# Patient Record
Sex: FEMALE | Race: WHITE | NOT HISPANIC OR LATINO | Employment: OTHER | URBAN - METROPOLITAN AREA
[De-identification: names, ages, dates, MRNs, and addresses within clinical notes are randomized per-mention and may not be internally consistent; named-entity substitution may affect disease eponyms.]

---

## 2023-04-12 LAB
ALANINE AMINOTRANSFERASE (SGPT) (U/L) IN SER/PLAS: 14 U/L (ref 7–45)
ALBUMIN (G/DL) IN SER/PLAS: 3.9 G/DL (ref 3.4–5)
ALKALINE PHOSPHATASE (U/L) IN SER/PLAS: 89 U/L (ref 33–136)
ANION GAP IN SER/PLAS: 13 MMOL/L (ref 10–20)
ASPARTATE AMINOTRANSFERASE (SGOT) (U/L) IN SER/PLAS: 22 U/L (ref 9–39)
BASOPHILS (10*3/UL) IN BLOOD BY AUTOMATED COUNT: 0.07 X10E9/L (ref 0–0.1)
BASOPHILS/100 LEUKOCYTES IN BLOOD BY AUTOMATED COUNT: 1.2 % (ref 0–2)
BILIRUBIN TOTAL (MG/DL) IN SER/PLAS: 0.4 MG/DL (ref 0–1.2)
CALCIDIOL (25 OH VITAMIN D3) (NG/ML) IN SER/PLAS: 27 NG/ML
CALCIUM (MG/DL) IN SER/PLAS: 8.8 MG/DL (ref 8.6–10.3)
CARBON DIOXIDE, TOTAL (MMOL/L) IN SER/PLAS: 24 MMOL/L (ref 21–32)
CHLORIDE (MMOL/L) IN SER/PLAS: 104 MMOL/L (ref 98–107)
CREATININE (MG/DL) IN SER/PLAS: 0.89 MG/DL (ref 0.5–1.05)
EOSINOPHILS (10*3/UL) IN BLOOD BY AUTOMATED COUNT: 0.28 X10E9/L (ref 0–0.4)
EOSINOPHILS/100 LEUKOCYTES IN BLOOD BY AUTOMATED COUNT: 4.9 % (ref 0–6)
ERYTHROCYTE DISTRIBUTION WIDTH (RATIO) BY AUTOMATED COUNT: 14.8 % (ref 11.5–14.5)
ERYTHROCYTE MEAN CORPUSCULAR HEMOGLOBIN CONCENTRATION (G/DL) BY AUTOMATED: 33.1 G/DL (ref 32–36)
ERYTHROCYTE MEAN CORPUSCULAR VOLUME (FL) BY AUTOMATED COUNT: 98 FL (ref 80–100)
ERYTHROCYTES (10*6/UL) IN BLOOD BY AUTOMATED COUNT: 4.29 X10E12/L (ref 4–5.2)
GFR FEMALE: 65 ML/MIN/1.73M2
GLUCOSE (MG/DL) IN SER/PLAS: 102 MG/DL (ref 74–99)
HEMATOCRIT (%) IN BLOOD BY AUTOMATED COUNT: 42 % (ref 36–46)
HEMOGLOBIN (G/DL) IN BLOOD: 13.9 G/DL (ref 12–16)
IMMATURE GRANULOCYTES/100 LEUKOCYTES IN BLOOD BY AUTOMATED COUNT: 0.4 % (ref 0–0.9)
LEUKOCYTES (10*3/UL) IN BLOOD BY AUTOMATED COUNT: 5.7 X10E9/L (ref 4.4–11.3)
LYMPHOCYTES (10*3/UL) IN BLOOD BY AUTOMATED COUNT: 1.39 X10E9/L (ref 0.8–3)
LYMPHOCYTES/100 LEUKOCYTES IN BLOOD BY AUTOMATED COUNT: 24.3 % (ref 13–44)
MONOCYTES (10*3/UL) IN BLOOD BY AUTOMATED COUNT: 0.45 X10E9/L (ref 0.05–0.8)
MONOCYTES/100 LEUKOCYTES IN BLOOD BY AUTOMATED COUNT: 7.9 % (ref 2–10)
NEUTROPHILS (10*3/UL) IN BLOOD BY AUTOMATED COUNT: 3.5 X10E9/L (ref 1.6–5.5)
NEUTROPHILS/100 LEUKOCYTES IN BLOOD BY AUTOMATED COUNT: 61.3 % (ref 40–80)
PARATHYRIN INTACT (PG/ML) IN SER/PLAS: 65.7 PG/ML (ref 18.5–88)
PLATELETS (10*3/UL) IN BLOOD AUTOMATED COUNT: 264 X10E9/L (ref 150–450)
POTASSIUM (MMOL/L) IN SER/PLAS: 4.1 MMOL/L (ref 3.5–5.3)
PROTEIN TOTAL: 7.3 G/DL (ref 6.4–8.2)
SEDIMENTATION RATE, ERYTHROCYTE: 47 MM/H (ref 0–30)
SODIUM (MMOL/L) IN SER/PLAS: 137 MMOL/L (ref 136–145)
UREA NITROGEN (MG/DL) IN SER/PLAS: 14 MG/DL (ref 6–23)

## 2023-08-09 LAB
ALANINE AMINOTRANSFERASE (SGPT) (U/L) IN SER/PLAS: 12 U/L (ref 7–45)
ALBUMIN (G/DL) IN SER/PLAS: 3.8 G/DL (ref 3.4–5)
ALKALINE PHOSPHATASE (U/L) IN SER/PLAS: 72 U/L (ref 33–136)
ANION GAP IN SER/PLAS: 12 MMOL/L (ref 10–20)
ASPARTATE AMINOTRANSFERASE (SGOT) (U/L) IN SER/PLAS: 19 U/L (ref 9–39)
BASOPHILS (10*3/UL) IN BLOOD BY AUTOMATED COUNT: 0.07 X10E9/L (ref 0–0.1)
BASOPHILS/100 LEUKOCYTES IN BLOOD BY AUTOMATED COUNT: 1 % (ref 0–2)
BILIRUBIN TOTAL (MG/DL) IN SER/PLAS: 0.4 MG/DL (ref 0–1.2)
C REACTIVE PROTEIN (MG/L) IN SER/PLAS: 2.71 MG/DL
CALCIDIOL (25 OH VITAMIN D3) (NG/ML) IN SER/PLAS: 17 NG/ML
CALCIUM (MG/DL) IN SER/PLAS: 9.3 MG/DL (ref 8.6–10.3)
CARBON DIOXIDE, TOTAL (MMOL/L) IN SER/PLAS: 26 MMOL/L (ref 21–32)
CHLORIDE (MMOL/L) IN SER/PLAS: 103 MMOL/L (ref 98–107)
CREATININE (MG/DL) IN SER/PLAS: 0.86 MG/DL (ref 0.5–1.05)
EOSINOPHILS (10*3/UL) IN BLOOD BY AUTOMATED COUNT: 0.13 X10E9/L (ref 0–0.4)
EOSINOPHILS/100 LEUKOCYTES IN BLOOD BY AUTOMATED COUNT: 1.9 % (ref 0–6)
ERYTHROCYTE DISTRIBUTION WIDTH (RATIO) BY AUTOMATED COUNT: 13.7 % (ref 11.5–14.5)
ERYTHROCYTE MEAN CORPUSCULAR HEMOGLOBIN CONCENTRATION (G/DL) BY AUTOMATED: 33.4 G/DL (ref 32–36)
ERYTHROCYTE MEAN CORPUSCULAR VOLUME (FL) BY AUTOMATED COUNT: 99 FL (ref 80–100)
ERYTHROCYTES (10*6/UL) IN BLOOD BY AUTOMATED COUNT: 4.31 X10E12/L (ref 4–5.2)
GFR FEMALE: 67 ML/MIN/1.73M2
GLUCOSE (MG/DL) IN SER/PLAS: 94 MG/DL (ref 74–99)
HEMATOCRIT (%) IN BLOOD BY AUTOMATED COUNT: 42.5 % (ref 36–46)
HEMOGLOBIN (G/DL) IN BLOOD: 14.2 G/DL (ref 12–16)
IMMATURE GRANULOCYTES/100 LEUKOCYTES IN BLOOD BY AUTOMATED COUNT: 0.7 % (ref 0–0.9)
LEUKOCYTES (10*3/UL) IN BLOOD BY AUTOMATED COUNT: 6.9 X10E9/L (ref 4.4–11.3)
LYMPHOCYTES (10*3/UL) IN BLOOD BY AUTOMATED COUNT: 1.13 X10E9/L (ref 0.8–3)
LYMPHOCYTES/100 LEUKOCYTES IN BLOOD BY AUTOMATED COUNT: 16.3 % (ref 13–44)
MONOCYTES (10*3/UL) IN BLOOD BY AUTOMATED COUNT: 0.79 X10E9/L (ref 0.05–0.8)
MONOCYTES/100 LEUKOCYTES IN BLOOD BY AUTOMATED COUNT: 11.4 % (ref 2–10)
NEUTROPHILS (10*3/UL) IN BLOOD BY AUTOMATED COUNT: 4.76 X10E9/L (ref 1.6–5.5)
NEUTROPHILS/100 LEUKOCYTES IN BLOOD BY AUTOMATED COUNT: 68.7 % (ref 40–80)
PLATELETS (10*3/UL) IN BLOOD AUTOMATED COUNT: 276 X10E9/L (ref 150–450)
POTASSIUM (MMOL/L) IN SER/PLAS: 4.1 MMOL/L (ref 3.5–5.3)
PROTEIN TOTAL: 7.6 G/DL (ref 6.4–8.2)
SEDIMENTATION RATE, ERYTHROCYTE: 63 MM/H (ref 0–30)
SODIUM (MMOL/L) IN SER/PLAS: 137 MMOL/L (ref 136–145)
UREA NITROGEN (MG/DL) IN SER/PLAS: 15 MG/DL (ref 6–23)

## 2023-10-11 ENCOUNTER — TELEPHONE (OUTPATIENT)
Dept: PRIMARY CARE | Facility: CLINIC | Age: 83
End: 2023-10-11
Payer: MEDICARE

## 2023-10-11 NOTE — TELEPHONE ENCOUNTER
Chief Complaint:    Symptoms: cough, coughing phlegm-yellow in color, just don't feel good     Duration: 2 weeks     Treatments:    Patient Requesting: Recommendation     Did the patient have their Covid Vaccine? She isn't sure     Pharmacy: [facility]

## 2023-10-16 DIAGNOSIS — Z23 FLU VACCINE NEED: ICD-10-CM

## 2023-10-16 PROBLEM — M15.9 GENERALIZED OA: Status: ACTIVE | Noted: 2023-10-16

## 2023-10-16 PROBLEM — M19.011 OSTEOARTHRITIS OF RIGHT SHOULDER: Status: ACTIVE | Noted: 2023-10-16

## 2023-10-16 PROBLEM — M06.4 POLYARTHROPATHY, INFLAMMATORY (MULTI): Status: ACTIVE | Noted: 2023-10-16

## 2023-10-16 PROBLEM — B02.30 HERPES ZOSTER OPHTHALMICUS OF RIGHT EYE: Status: ACTIVE | Noted: 2023-10-16

## 2023-10-16 PROBLEM — M35.3 PMR (POLYMYALGIA RHEUMATICA) (MULTI): Status: ACTIVE | Noted: 2023-10-16

## 2023-10-16 PROBLEM — M35.02: Status: ACTIVE | Noted: 2023-10-16

## 2023-10-16 PROBLEM — M81.0 OSTEOPOROSIS, SENILE: Status: ACTIVE | Noted: 2023-10-16

## 2023-10-16 PROBLEM — M47.817 LUMBAR AND SACRAL SPONDYLARTHRITIS: Status: ACTIVE | Noted: 2023-10-16

## 2023-10-16 PROBLEM — B37.2 CANDIDIASIS OF SKIN: Status: ACTIVE | Noted: 2023-10-16

## 2023-10-16 PROBLEM — J84.178: Status: ACTIVE | Noted: 2023-10-16

## 2023-10-16 PROBLEM — J18.9 PNEUMONIA: Status: ACTIVE | Noted: 2023-10-16

## 2023-10-16 PROBLEM — K21.9 GASTROESOPHAGEAL REFLUX DISEASE WITHOUT ESOPHAGITIS: Status: ACTIVE | Noted: 2023-10-16

## 2023-10-16 PROBLEM — M54.16 RADICULOPATHY, LUMBAR REGION: Status: ACTIVE | Noted: 2023-10-16

## 2023-10-16 PROBLEM — J30.2 PERENNIAL ALLERGIC RHINITIS WITH SEASONAL VARIATION: Status: ACTIVE | Noted: 2023-10-16

## 2023-10-16 PROBLEM — M19.012 OSTEOARTHRITIS OF LEFT SHOULDER: Status: ACTIVE | Noted: 2023-10-16

## 2023-10-16 PROBLEM — J30.89 PERENNIAL ALLERGIC RHINITIS WITH SEASONAL VARIATION: Status: ACTIVE | Noted: 2023-10-16

## 2023-10-16 PROBLEM — G93.32 CHRONIC FATIGUE SYNDROME WITH FIBROMYALGIA: Status: ACTIVE | Noted: 2023-10-16

## 2023-10-16 PROBLEM — M06.9 RHEUMATOID ARTHRITIS (MULTI): Status: ACTIVE | Noted: 2023-10-16

## 2023-10-16 PROBLEM — M79.7 FIBROMYALGIA: Status: ACTIVE | Noted: 2023-10-16

## 2023-10-16 PROBLEM — N39.41 SENSORY URGE INCONTINENCE: Status: ACTIVE | Noted: 2023-10-16

## 2023-10-16 PROBLEM — R35.0 FREQUENT URINATION: Status: ACTIVE | Noted: 2023-10-16

## 2023-10-16 PROBLEM — M79.7 CHRONIC FATIGUE SYNDROME WITH FIBROMYALGIA: Status: ACTIVE | Noted: 2023-10-16

## 2023-10-16 PROBLEM — E55.9 VITAMIN D DEFICIENCY: Status: ACTIVE | Noted: 2023-10-16

## 2023-10-16 RX ORDER — ACETAMINOPHEN 500 MG
1000 TABLET ORAL
COMMUNITY

## 2023-10-16 RX ORDER — METHOTREXATE 2.5 MG/1
12.5 TABLET ORAL WEEKLY
COMMUNITY
Start: 2021-11-17 | End: 2023-11-07

## 2023-10-16 RX ORDER — ACYCLOVIR 800 MG/1
800 TABLET ORAL
COMMUNITY
Start: 2023-04-28

## 2023-10-16 RX ORDER — GANCICLOVIR 1.5 MG/G
1 GEL OPHTHALMIC
COMMUNITY
Start: 2023-06-27

## 2023-10-16 RX ORDER — ALENDRONATE SODIUM 70 MG/1
70 TABLET ORAL
COMMUNITY
Start: 2022-05-05

## 2023-10-16 RX ORDER — FOLIC ACID 1 MG/1
2 TABLET ORAL
COMMUNITY
Start: 2023-08-07 | End: 2024-02-03

## 2023-10-17 ENCOUNTER — OFFICE VISIT (OUTPATIENT)
Dept: PRIMARY CARE | Facility: CLINIC | Age: 83
End: 2023-10-17
Payer: MEDICARE

## 2023-10-17 VITALS
HEIGHT: 64 IN | WEIGHT: 169 LBS | BODY MASS INDEX: 28.85 KG/M2 | DIASTOLIC BLOOD PRESSURE: 60 MMHG | HEART RATE: 60 BPM | SYSTOLIC BLOOD PRESSURE: 112 MMHG

## 2023-10-17 DIAGNOSIS — G93.32 CHRONIC FATIGUE SYNDROME WITH FIBROMYALGIA: Primary | ICD-10-CM

## 2023-10-17 DIAGNOSIS — E55.9 VITAMIN D DEFICIENCY: ICD-10-CM

## 2023-10-17 DIAGNOSIS — M79.7 CHRONIC FATIGUE SYNDROME WITH FIBROMYALGIA: Primary | ICD-10-CM

## 2023-10-17 DIAGNOSIS — M35.02: ICD-10-CM

## 2023-10-17 DIAGNOSIS — M05.741 RHEUMATOID ARTHRITIS INVOLVING BOTH HANDS WITH POSITIVE RHEUMATOID FACTOR (MULTI): ICD-10-CM

## 2023-10-17 DIAGNOSIS — Z23 FLU VACCINE NEED: ICD-10-CM

## 2023-10-17 DIAGNOSIS — N39.41 SENSORY URGE INCONTINENCE: ICD-10-CM

## 2023-10-17 DIAGNOSIS — M05.742 RHEUMATOID ARTHRITIS INVOLVING BOTH HANDS WITH POSITIVE RHEUMATOID FACTOR (MULTI): ICD-10-CM

## 2023-10-17 DIAGNOSIS — M06.4 POLYARTHROPATHY, INFLAMMATORY (MULTI): ICD-10-CM

## 2023-10-17 DIAGNOSIS — M35.3 PMR (POLYMYALGIA RHEUMATICA) (MULTI): ICD-10-CM

## 2023-10-17 DIAGNOSIS — B02.30 HERPES ZOSTER OPHTHALMICUS OF RIGHT EYE: ICD-10-CM

## 2023-10-17 PROBLEM — R35.0 FREQUENT URINATION: Status: RESOLVED | Noted: 2023-10-16 | Resolved: 2023-10-17

## 2023-10-17 PROBLEM — J18.9 PNEUMONIA: Status: RESOLVED | Noted: 2023-10-16 | Resolved: 2023-10-17

## 2023-10-17 PROCEDURE — 1159F MED LIST DOCD IN RCRD: CPT | Performed by: INTERNAL MEDICINE

## 2023-10-17 PROCEDURE — 99214 OFFICE O/P EST MOD 30 MIN: CPT | Performed by: INTERNAL MEDICINE

## 2023-10-17 PROCEDURE — 90662 IIV NO PRSV INCREASED AG IM: CPT | Performed by: INTERNAL MEDICINE

## 2023-10-17 PROCEDURE — 1160F RVW MEDS BY RX/DR IN RCRD: CPT | Performed by: INTERNAL MEDICINE

## 2023-10-17 PROCEDURE — G0008 ADMIN INFLUENZA VIRUS VAC: HCPCS | Performed by: INTERNAL MEDICINE

## 2023-10-17 PROCEDURE — 1036F TOBACCO NON-USER: CPT | Performed by: INTERNAL MEDICINE

## 2023-10-17 RX ORDER — CHOLECALCIFEROL (VITAMIN D3) 50 MCG
50 TABLET ORAL DAILY
Qty: 90 TABLET | Refills: 3 | Status: SHIPPED | OUTPATIENT
Start: 2023-10-17 | End: 2023-10-18 | Stop reason: SDUPTHER

## 2023-10-17 ASSESSMENT — ANXIETY QUESTIONNAIRES
5. BEING SO RESTLESS THAT IT IS HARD TO SIT STILL: NOT AT ALL
2. NOT BEING ABLE TO STOP OR CONTROL WORRYING: NOT AT ALL
7. FEELING AFRAID AS IF SOMETHING AWFUL MIGHT HAPPEN: NOT AT ALL
6. BECOMING EASILY ANNOYED OR IRRITABLE: NOT AT ALL
4. TROUBLE RELAXING: NOT AT ALL
1. FEELING NERVOUS, ANXIOUS, OR ON EDGE: NOT AT ALL
3. WORRYING TOO MUCH ABOUT DIFFERENT THINGS: NOT AT ALL
IF YOU CHECKED OFF ANY PROBLEMS ON THIS QUESTIONNAIRE, HOW DIFFICULT HAVE THESE PROBLEMS MADE IT FOR YOU TO DO YOUR WORK, TAKE CARE OF THINGS AT HOME, OR GET ALONG WITH OTHER PEOPLE: NOT DIFFICULT AT ALL
GAD7 TOTAL SCORE: 0

## 2023-10-17 ASSESSMENT — ENCOUNTER SYMPTOMS
OCCASIONAL FEELINGS OF UNSTEADINESS: 0
DEPRESSION: 0
COUGH: 1
SHORTNESS OF BREATH: 1
FATIGUE: 1
LOSS OF SENSATION IN FEET: 0

## 2023-10-17 ASSESSMENT — COLUMBIA-SUICIDE SEVERITY RATING SCALE - C-SSRS
2. HAVE YOU ACTUALLY HAD ANY THOUGHTS OF KILLING YOURSELF?: NO
1. IN THE PAST MONTH, HAVE YOU WISHED YOU WERE DEAD OR WISHED YOU COULD GO TO SLEEP AND NOT WAKE UP?: NO
6. HAVE YOU EVER DONE ANYTHING, STARTED TO DO ANYTHING, OR PREPARED TO DO ANYTHING TO END YOUR LIFE?: NO

## 2023-10-17 ASSESSMENT — PATIENT HEALTH QUESTIONNAIRE - PHQ9
1. LITTLE INTEREST OR PLEASURE IN DOING THINGS: NOT AT ALL
2. FEELING DOWN, DEPRESSED OR HOPELESS: NOT AT ALL
SUM OF ALL RESPONSES TO PHQ9 QUESTIONS 1 AND 2: 0

## 2023-10-17 NOTE — ASSESSMENT & PLAN NOTE
Previous work-up in the past includes prolapse consider referral back to urology for candidate for anticholinergic medications secondary to side effects

## 2023-10-17 NOTE — ASSESSMENT & PLAN NOTE
Stable at this time chronic fibrotic changes no evidence of superimposed bronchiectasis at this time

## 2023-10-17 NOTE — ASSESSMENT & PLAN NOTE
Vitamin D levels low in the setting of treatment for osteoporosis and use of steroids for PMR rheumatoid arthritis and Sjogren's start 2000 units vitamin D and reevaluate

## 2023-10-17 NOTE — PROGRESS NOTES
"Subjective   Reason for Visit: Roxi Barrios is an 83 y.o. female here for a fu visit.     Past Medical, Surgical, and Family History reviewed and updated in chart.    Reviewed all medications by prescribing practitioner or clinical pharmacist (such as prescriptions, OTCs, herbal therapies and supplements) and documented in the medical record.    Patient with history of rheumatoid arthritis of hands also Sjogren's syndrome with postinflammatory pulmonary fibrosis history of chronic fatigue syndrome fibromyalgia polymyalgia rheumatica and pseudogout manage mostly through rheumatologist concomitant severe osteoarthritis of shoulder joints with mild cough improving at this time recently placed on steroid taper for pseudogout and arthritis reviewed labs from rheumatology stable and improved inflammatory levels at this time history of bronchiectasis with chronic fibrotic changes sounds on lungs without severe exacerbation at this time        Patient Care Team:  Raji Freedman DO as PCP - General     Review of Systems   Constitutional:  Positive for fatigue.   Respiratory:  Positive for cough and shortness of breath.        Objective   Vitals:  /60 (BP Location: Left arm, Patient Position: Sitting)   Pulse 60   Ht 1.626 m (5' 4\")   Wt 76.7 kg (169 lb)   BMI 29.01 kg/m²       Physical Exam  Vitals and nursing note reviewed.   Constitutional:       General: She is not in acute distress.     Appearance: Normal appearance. She is well-developed. She is not toxic-appearing.   HENT:      Head: Normocephalic and atraumatic.      Right Ear: Tympanic membrane and external ear normal.      Left Ear: Tympanic membrane and external ear normal.      Nose: Nose normal.      Mouth/Throat:      Mouth: Mucous membranes are moist.      Pharynx: Oropharynx is clear. No oropharyngeal exudate or posterior oropharyngeal erythema.      Tonsils: No tonsillar exudate. 2+ on the right. 2+ on the left.   Eyes:      Extraocular " Movements: Extraocular movements intact.      Conjunctiva/sclera: Conjunctivae normal.   Cardiovascular:      Rate and Rhythm: Normal rate and regular rhythm.      Pulses: Normal pulses.      Heart sounds: Normal heart sounds. No murmur heard.  Pulmonary:      Effort: Pulmonary effort is normal.      Breath sounds: Rhonchi present.   Abdominal:      General: Abdomen is flat. Bowel sounds are normal.      Palpations: Abdomen is soft.   Musculoskeletal:      Cervical back: Neck supple.   Lymphadenopathy:      Cervical: No cervical adenopathy.   Skin:     General: Skin is warm and dry.      Findings: No rash.   Neurological:      Mental Status: She is alert. Mental status is at baseline.   Psychiatric:         Mood and Affect: Mood normal.         Behavior: Behavior normal.         Thought Content: Thought content normal.         Judgment: Judgment normal.         Assessment/Plan   Problem List Items Addressed This Visit       Chronic fatigue syndrome with fibromyalgia - Primary    Current Assessment & Plan     Continue to encourage adequate sleep regular activity and exercise as tolerated         Relevant Medications    cholecalciferol (Vitamin D3) 50 MCG (2000 UT) tablet    Other Relevant Orders    Follow Up In [facility] - PCP - Established    Disorder of lung with Sjogren's syndrome (CMS/[facility])    Current Assessment & Plan     Stable at this time chronic fibrotic changes no evidence of superimposed bronchiectasis at this time         Relevant Medications    cholecalciferol (Vitamin D3) 50 MCG (2000 UT) tablet    Other Relevant Orders    Follow Up In [facility] - PCP - Established    Herpes zoster ophthalmicus of right eye    Current Assessment & Plan     Continue with artificial tear formulation given by ophthalmologist         Polyarthropathy, 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)    Current Assessment & Plan     Stabilizing on current prednisone taper         Sensory urge incontinence    Current Assessment & Plan     Previous work-up in the past includes prolapse consider referral back to urology for candidate for anticholinergic medications secondary to side effects         Vitamin D deficiency    Current Assessment & Plan     Vitamin D levels low in the setting of treatment for osteoporosis and use of steroids for PMR rheumatoid arthritis and Sjogren's start 2000 units vitamin D and reevaluate         Relevant Medications    cholecalciferol (Vitamin D3) 50 MCG (2000 UT) tablet    Other Relevant Orders    Follow Up In [facility] - PCP - Established    Adult body mass index 29.0-29.9    Current Assessment & Plan     Improvement in overall weight continue          Other Visit Diagnoses       Flu vaccine need

## 2023-10-17 NOTE — PROGRESS NOTES
"Subjective   Patient ID: Roxi Barrios is a 83 y.o. female who presents for Generalized Body Aches.    HPI     Review of Systems    Objective   /60 (BP Location: Left arm, Patient Position: Sitting)   Pulse 60   Ht 1.626 m (5' 4\")   Wt 76.7 kg (169 lb)   BMI 29.01 kg/m²     Physical Exam    Assessment/Plan          "

## 2023-10-17 NOTE — ASSESSMENT & PLAN NOTE
>>ASSESSMENT AND PLAN FOR RHEUMATOID ARTHRITIS (CMS/[facility]) WRITTEN ON 10/17/2023  7:02 PM BY KRIS RAMIREZ, DO    Mostly affecting hands no evidence of acute synovitis on exam today continues on methotrexate 2.5 mg 5 pills weekly on Sunday has follow-up with rheumatologist end of this month    >>ASSESSMENT AND PLAN FOR POLYARTHROPATHY, INFLAMMATORY (CMS/[facility]) WRITTEN ON 10/17/2023  7:01 PM BY KRIS RAMIREZ, DO    Continue with prednisone taper

## 2023-10-18 ENCOUNTER — TELEPHONE (OUTPATIENT)
Dept: PRIMARY CARE | Facility: CLINIC | Age: 83
End: 2023-10-18
Payer: MEDICARE

## 2023-10-18 DIAGNOSIS — G93.32 CHRONIC FATIGUE SYNDROME WITH FIBROMYALGIA: ICD-10-CM

## 2023-10-18 DIAGNOSIS — M05.741 RHEUMATOID ARTHRITIS INVOLVING BOTH HANDS WITH POSITIVE RHEUMATOID FACTOR (MULTI): ICD-10-CM

## 2023-10-18 DIAGNOSIS — M35.02: ICD-10-CM

## 2023-10-18 DIAGNOSIS — M79.7 CHRONIC FATIGUE SYNDROME WITH FIBROMYALGIA: ICD-10-CM

## 2023-10-18 DIAGNOSIS — M05.742 RHEUMATOID ARTHRITIS INVOLVING BOTH HANDS WITH POSITIVE RHEUMATOID FACTOR (MULTI): ICD-10-CM

## 2023-10-18 DIAGNOSIS — E55.9 VITAMIN D DEFICIENCY: ICD-10-CM

## 2023-10-18 RX ORDER — CHOLECALCIFEROL (VITAMIN D3) 50 MCG
50 TABLET ORAL DAILY
Qty: 90 TABLET | Refills: 3 | Status: SHIPPED | OUTPATIENT
Start: 2023-10-18 | End: 2024-10-17

## 2023-10-18 NOTE — TELEPHONE ENCOUNTER
Vitamin D was suppose to be sent to local pharmacy, can you resent it please       [place]:Moises

## 2023-10-30 ENCOUNTER — TELEPHONE (OUTPATIENT)
Dept: PRIMARY CARE | Facility: CLINIC | Age: 83
End: 2023-10-30
Payer: MEDICARE

## 2023-10-30 VITALS
RESPIRATION RATE: 18 BRPM | TEMPERATURE: 98.1 F | HEIGHT: 63 IN | OXYGEN SATURATION: 94 % | SYSTOLIC BLOOD PRESSURE: 142 MMHG | WEIGHT: 158 LBS | DIASTOLIC BLOOD PRESSURE: 86 MMHG | BODY MASS INDEX: 28 KG/M2

## 2023-10-30 PROCEDURE — 99283 EMERGENCY DEPT VISIT LOW MDM: CPT | Mod: 25

## 2023-10-30 PROCEDURE — 99284 EMERGENCY DEPT VISIT MOD MDM: CPT | Performed by: EMERGENCY MEDICINE

## 2023-10-30 PROCEDURE — 96372 THER/PROPH/DIAG INJ SC/IM: CPT

## 2023-10-30 ASSESSMENT — COLUMBIA-SUICIDE SEVERITY RATING SCALE - C-SSRS
6. HAVE YOU EVER DONE ANYTHING, STARTED TO DO ANYTHING, OR PREPARED TO DO ANYTHING TO END YOUR LIFE?: NO
2. HAVE YOU ACTUALLY HAD ANY THOUGHTS OF KILLING YOURSELF?: NO
1. IN THE PAST MONTH, HAVE YOU WISHED YOU WERE DEAD OR WISHED YOU COULD GO TO SLEEP AND NOT WAKE UP?: NO

## 2023-10-30 ASSESSMENT — PAIN SCALES - GENERAL: PAINLEVEL_OUTOF10: 9

## 2023-10-30 ASSESSMENT — PAIN - FUNCTIONAL ASSESSMENT: PAIN_FUNCTIONAL_ASSESSMENT: 0-10

## 2023-10-30 NOTE — TELEPHONE ENCOUNTER
She called cause she would like some input about her neck, back, hip & knees they are getting so sore and she just don't know what to do please call her at [phone] please advise

## 2023-10-31 ENCOUNTER — HOSPITAL ENCOUNTER (EMERGENCY)
Facility: HOSPITAL | Age: 83
Discharge: HOME | End: 2023-10-31
Attending: EMERGENCY MEDICINE
Payer: MEDICARE

## 2023-10-31 DIAGNOSIS — M54.2 NECK PAIN: Primary | ICD-10-CM

## 2023-10-31 PROCEDURE — 2500000005 HC RX 250 GENERAL PHARMACY W/O HCPCS: Performed by: EMERGENCY MEDICINE

## 2023-10-31 PROCEDURE — 2500000001 HC RX 250 WO HCPCS SELF ADMINISTERED DRUGS (ALT 637 FOR MEDICARE OP): Performed by: EMERGENCY MEDICINE

## 2023-10-31 PROCEDURE — 2500000004 HC RX 250 GENERAL PHARMACY W/ HCPCS (ALT 636 FOR OP/ED): Performed by: EMERGENCY MEDICINE

## 2023-10-31 RX ORDER — LIDOCAINE 560 MG/1
1 PATCH PERCUTANEOUS; TOPICAL; TRANSDERMAL ONCE
Status: DISCONTINUED | OUTPATIENT
Start: 2023-10-31 | End: 2023-10-31 | Stop reason: HOSPADM

## 2023-10-31 RX ORDER — KETOROLAC TROMETHAMINE 30 MG/ML
15 INJECTION, SOLUTION INTRAMUSCULAR; INTRAVENOUS ONCE
Status: COMPLETED | OUTPATIENT
Start: 2023-10-31 | End: 2023-10-31

## 2023-10-31 RX ORDER — LIDOCAINE 560 MG/1
1 PATCH PERCUTANEOUS; TOPICAL; TRANSDERMAL DAILY
Status: DISCONTINUED | OUTPATIENT
Start: 2023-10-31 | End: 2023-10-31

## 2023-10-31 RX ORDER — CYCLOBENZAPRINE HCL 10 MG
5 TABLET ORAL ONCE
Status: COMPLETED | OUTPATIENT
Start: 2023-10-31 | End: 2023-10-31

## 2023-10-31 RX ORDER — LIDOCAINE 560 MG/1
1 PATCH PERCUTANEOUS; TOPICAL; TRANSDERMAL DAILY
Status: DISCONTINUED | OUTPATIENT
Start: 2023-10-31 | End: 2023-10-31 | Stop reason: HOSPADM

## 2023-10-31 RX ADMIN — KETOROLAC TROMETHAMINE 15 MG: 30 INJECTION, SOLUTION INTRAMUSCULAR at 02:15

## 2023-10-31 RX ADMIN — LIDOCAINE 1 PATCH: 4 PATCH TOPICAL at 02:15

## 2023-10-31 RX ADMIN — CYCLOBENZAPRINE 5 MG: 10 TABLET, FILM COATED ORAL at 05:01

## 2023-11-08 ENCOUNTER — LAB (OUTPATIENT)
Dept: LAB | Facility: LAB | Age: 83
End: 2023-11-08
Payer: MEDICARE

## 2023-11-08 ENCOUNTER — OFFICE VISIT (OUTPATIENT)
Dept: PRIMARY CARE | Facility: CLINIC | Age: 83
End: 2023-11-08
Payer: MEDICARE

## 2023-11-08 VITALS
BODY MASS INDEX: 30 KG/M2 | HEART RATE: 60 BPM | RESPIRATION RATE: 16 BRPM | HEIGHT: 63 IN | DIASTOLIC BLOOD PRESSURE: 74 MMHG | OXYGEN SATURATION: 95 % | WEIGHT: 169.3 LBS | SYSTOLIC BLOOD PRESSURE: 123 MMHG

## 2023-11-08 DIAGNOSIS — R35.0 URINARY FREQUENCY: ICD-10-CM

## 2023-11-08 DIAGNOSIS — M06.4 POLYARTHROPATHY, INFLAMMATORY (MULTI): ICD-10-CM

## 2023-11-08 DIAGNOSIS — M79.10 MYALGIA: ICD-10-CM

## 2023-11-08 DIAGNOSIS — R10.9 ACUTE LEFT FLANK PAIN: Primary | ICD-10-CM

## 2023-11-08 LAB
BASOPHILS # BLD AUTO: 0.07 X10*3/UL (ref 0–0.1)
BASOPHILS NFR BLD AUTO: 1 %
CRP SERPL-MCNC: 2.11 MG/DL
EOSINOPHIL # BLD AUTO: 0.11 X10*3/UL (ref 0–0.4)
EOSINOPHIL NFR BLD AUTO: 1.5 %
ERYTHROCYTE [DISTWIDTH] IN BLOOD BY AUTOMATED COUNT: 13.2 % (ref 11.5–14.5)
ERYTHROCYTE [SEDIMENTATION RATE] IN BLOOD BY WESTERGREN METHOD: 64 MM/H (ref 0–30)
HCT VFR BLD AUTO: 43.1 % (ref 36–46)
HGB BLD-MCNC: 14.4 G/DL (ref 12–16)
IMM GRANULOCYTES # BLD AUTO: 0.04 X10*3/UL (ref 0–0.5)
IMM GRANULOCYTES NFR BLD AUTO: 0.6 % (ref 0–0.9)
LYMPHOCYTES # BLD AUTO: 1.63 X10*3/UL (ref 0.8–3)
LYMPHOCYTES NFR BLD AUTO: 22.8 %
MCH RBC QN AUTO: 32.5 PG (ref 26–34)
MCHC RBC AUTO-ENTMCNC: 33.4 G/DL (ref 32–36)
MCV RBC AUTO: 97 FL (ref 80–100)
MONOCYTES # BLD AUTO: 0.66 X10*3/UL (ref 0.05–0.8)
MONOCYTES NFR BLD AUTO: 9.2 %
NEUTROPHILS # BLD AUTO: 4.64 X10*3/UL (ref 1.6–5.5)
NEUTROPHILS NFR BLD AUTO: 64.9 %
NRBC BLD-RTO: 0 /100 WBCS (ref 0–0)
PLATELET # BLD AUTO: 289 X10*3/UL (ref 150–450)
POC APPEARANCE, URINE: ABNORMAL
POC BILIRUBIN, URINE: NEGATIVE
POC BLOOD, URINE: ABNORMAL
POC COLOR, URINE: YELLOW
POC GLUCOSE, URINE: NEGATIVE MG/DL
POC KETONES, URINE: NEGATIVE MG/DL
POC LEUKOCYTES, URINE: ABNORMAL
POC NITRITE,URINE: NEGATIVE
POC PH, URINE: 5.5 PH
POC PROTEIN, URINE: NEGATIVE MG/DL
POC SPECIFIC GRAVITY, URINE: 1.02
POC UROBILINOGEN, URINE: 0.2 EU/DL
RBC # BLD AUTO: 4.43 X10*6/UL (ref 4–5.2)
WBC # BLD AUTO: 7.2 X10*3/UL (ref 4.4–11.3)

## 2023-11-08 PROCEDURE — 86140 C-REACTIVE PROTEIN: CPT

## 2023-11-08 PROCEDURE — 1159F MED LIST DOCD IN RCRD: CPT | Performed by: FAMILY MEDICINE

## 2023-11-08 PROCEDURE — 81003 URINALYSIS AUTO W/O SCOPE: CPT | Performed by: FAMILY MEDICINE

## 2023-11-08 PROCEDURE — 1036F TOBACCO NON-USER: CPT | Performed by: FAMILY MEDICINE

## 2023-11-08 PROCEDURE — 85025 COMPLETE CBC W/AUTO DIFF WBC: CPT

## 2023-11-08 PROCEDURE — 87086 URINE CULTURE/COLONY COUNT: CPT

## 2023-11-08 PROCEDURE — 1125F AMNT PAIN NOTED PAIN PRSNT: CPT | Performed by: FAMILY MEDICINE

## 2023-11-08 PROCEDURE — 85652 RBC SED RATE AUTOMATED: CPT

## 2023-11-08 PROCEDURE — 1160F RVW MEDS BY RX/DR IN RCRD: CPT | Performed by: FAMILY MEDICINE

## 2023-11-08 PROCEDURE — 36415 COLL VENOUS BLD VENIPUNCTURE: CPT

## 2023-11-08 PROCEDURE — 99213 OFFICE O/P EST LOW 20 MIN: CPT | Performed by: FAMILY MEDICINE

## 2023-11-08 RX ORDER — PREDNISONE 20 MG/1
TABLET ORAL
Qty: 24 TABLET | Refills: 0 | Status: SHIPPED | OUTPATIENT
Start: 2023-11-08 | End: 2023-11-20

## 2023-11-08 ASSESSMENT — ENCOUNTER SYMPTOMS
BACK PAIN: 1
MYALGIAS: 1
LOSS OF SENSATION IN FEET: 0
DEPRESSION: 0
OCCASIONAL FEELINGS OF UNSTEADINESS: 0
FREQUENCY: 1
ARTHRALGIAS: 1

## 2023-11-08 NOTE — PATIENT INSTRUCTIONS
Diagnoses and all orders for this visit:  Acute left flank pain  -     POCT UA Automated manually resulted  Myalgia  -     XR chest 2 views; Future  -     CBC and Auto Differential; Future  -     Sedimentation Rate; Future  -     C-reactive protein; Future  -     predniSONE (Deltasone) 20 mg tablet; Take 3 tablets (60 mg) by mouth once daily for 4 days, THEN 2 tablets (40 mg) once daily for 4 days, THEN 1 tablet (20 mg) once daily for 4 days. T.  Polyarthropathy, inflammatory (CMS/HCC)  -     XR chest 2 views; Future  -     CBC and Auto Differential; Future  -     Sedimentation Rate; Future  -     C-reactive protein; Future  -     predniSONE (Deltasone) 20 mg tablet; Take 3 tablets (60 mg) by mouth once daily for 4 days, THEN 2 tablets (40 mg) once daily for 4 days, THEN 1 tablet (20 mg) once daily for 4 days.

## 2023-11-08 NOTE — PROGRESS NOTES
Subjective   Patient ID: Roxi Barrios is a 83 y.o. female.    HPI  83 year old female with onsetof left sided flank pain m and then shoulders and knees are so sore that she can barely move, was also bad 1/31/23 and prompted her to go to ER.Shingles in right eye, regarding left sided pain urinates all the time without change in odor or color, legs have stayed cold. No fever, cough congestion, bowels moving well. No chills.   Review of Systems   Genitourinary:  Positive for frequency.   Musculoskeletal:  Positive for arthralgias, back pain and myalgias.        Proximal muscles and left flank.    All other systems reviewed and are negative.  Had remote diagnosis of PMR from Dr. Ramirez. No labwork done since August. No recent ESR. Methotrexate currently;y 5/week. Described pain in left flank region and bilateral symmetrical shoulder weakness and pain.     Objective   Physical Exam  Vitals reviewed.   Constitutional:       Appearance: Normal appearance.   HENT:      Head: Normocephalic and atraumatic.      Right Ear: Tympanic membrane normal.      Left Ear: Tympanic membrane normal.      Nose: Nose normal.      Mouth/Throat:      Mouth: Mucous membranes are moist.      Pharynx: Oropharynx is clear.   Eyes:      Extraocular Movements: Extraocular movements intact.      Conjunctiva/sclera: Conjunctivae normal.      Pupils: Pupils are equal, round, and reactive to light.   Cardiovascular:      Rate and Rhythm: Normal rate and regular rhythm.      Pulses: Normal pulses.      Heart sounds: Normal heart sounds.   Pulmonary:      Effort: Pulmonary effort is normal.      Comments: Decreaased breath sounds left base  Abdominal:      General: Abdomen is flat. Bowel sounds are normal.      Palpations: Abdomen is soft.   Musculoskeletal:         General: Tenderness present. Normal range of motion.      Cervical back: Normal range of motion and neck supple.      Comments: Bilateral shoulders and knees tender, left flank tender to  touch no rashes present.    Skin:     General: Skin is warm and dry.      Capillary Refill: Capillary refill takes less than 2 seconds.   Neurological:      General: No focal deficit present.      Mental Status: She is alert and oriented to person, place, and time.      Motor: Weakness present.      Comments: Bilateral upper extremity   Psychiatric:         Mood and Affect: Mood normal.         Behavior: Behavior normal.       Assessment/Plan   Diagnoses and all orders for this visit:  Acute left flank pain  -     POCT UA Automated manually resulted  Myalgia  -     XR chest 2 views; Future  -     CBC and Auto Differential; Future  -     Sedimentation Rate; Future  -     C-reactive protein; Future  -     predniSONE (Deltasone) 20 mg tablet; Take 3 tablets (60 mg) by mouth once daily for 4 days, THEN 2 tablets (40 mg) once daily for 4 days, THEN 1 tablet (20 mg) once daily for 4 days. T.  Polyarthropathy, inflammatory (CMS/HCC)  -     XR chest 2 views; Future  -     CBC and Auto Differential; Future  -     Sedimentation Rate; Future  -     C-reactive protein; Future  -     predniSONE (Deltasone) 20 mg tablet; Take 3 tablets (60 mg) by mouth once daily for 4 days, THEN 2 tablets (40 mg) once daily for 4 days, THEN 1 tablet (20 mg) once daily for 4 days. T.  Concern for PMR, as is has been in her history and she has autoimmune. Will start on prednisone and get ESR, CBC CRP.   Will also get xray to assure that not referred pain from lung.     u/a today as well due to flank pain and frequency. Small blood, trace leuks  Will notify of results.

## 2023-11-10 ENCOUNTER — HOSPITAL ENCOUNTER (OUTPATIENT)
Dept: RADIOLOGY | Facility: HOSPITAL | Age: 83
Discharge: HOME | End: 2023-11-10
Payer: MEDICARE

## 2023-11-10 DIAGNOSIS — M06.4 POLYARTHROPATHY, INFLAMMATORY (MULTI): ICD-10-CM

## 2023-11-10 DIAGNOSIS — M79.10 MYALGIA: ICD-10-CM

## 2023-11-10 LAB — BACTERIA UR CULT: NORMAL

## 2023-11-10 PROCEDURE — 71046 X-RAY EXAM CHEST 2 VIEWS: CPT | Mod: FY

## 2023-11-10 PROCEDURE — 71046 X-RAY EXAM CHEST 2 VIEWS: CPT | Performed by: RADIOLOGY

## 2023-11-13 ENCOUNTER — TELEPHONE (OUTPATIENT)
Dept: PRIMARY CARE | Facility: CLINIC | Age: 83
End: 2023-11-13
Payer: MEDICARE

## 2023-11-13 NOTE — RESULT ENCOUNTER NOTE
Unchanged chest -ray but showed elevated diaphragm on left which would explain the decreased breath sounds.

## 2023-11-13 NOTE — ED PROVIDER NOTES
Roxi Barrios is a 83 y.o. patient presenting to the ED for neck pain.  The pain has been increasing over the last 3 days.  It began when she woke up after sleeping awkwardly.  She denies any other injury or trigger for her pain.  She has been using acetaminophen without improvement.    Additional History Obtained from: None  Limitations to History: None  ------------------------------------------------------------------------------------------------------------------------------------------  Physical Exam:  Appearance: Alert, cooperative,   Skin: Warm, dry, appropriate color for ethnicity.  Eyes: Cornea clear. No scleral icterus or injection.   ENT: Mucous membranes moist.  Pulmonary: No accessory muscle use or stridor. Clear lung sounds bilaterally without rhonchi or wheezing.   Cardiac: Heart sounds regular without murmur. B/L radial pulses full and symmetric.   Abdomen: Soft, not tender.  No rebound or guarding.   Musculoskeletal: No gross deformities.  No midline spine tenderness, step-offs, deformities.  Neurological: Face symmetrical. Voice clear. Appropriately conversant.  Normal gait.  Psychiatric: Appropriate mood and affect.    Medical Decision Making:  Chronic Medical Conditions Significantly Affecting Care:  has a past medical history of Acute cystitis with hematuria (11/18/2021), Other specified noninflammatory disorders of vagina (07/28/2021), Pain, unspecified (11/17/2021), Personal history of other diseases of the musculoskeletal system and connective tissue, Personal history of other diseases of the musculoskeletal system and connective tissue (06/30/2020), Personal history of other diseases of the musculoskeletal system and connective tissue (06/10/2020), Personal history of other diseases of the respiratory system (09/30/2020), Personal history of other mental and behavioral disorders, Personal history of other specified conditions (09/30/2020), Personal history of other specified conditions  (01/26/2021), and Personal history of other specified conditions (06/13/2016).    Social Determinants of Health Significantly Affecting Care: Not identified.    Differential Diagnosis Considered but not limited to: Suspect musculoskeletal pain.  The patient is neuro vascularly intact.  No apparent trauma.      External Records Reviewed:   I reviewed recent and relevant outside records including: None    Diagnoses as of 11/13/23 1831   Neck pain         Escalation of Care:  Patient was treated symptomatically with Flexeril and Toradol.  She reports significant improvement in pain with this.  She was provided prescription for lidocaine patches to use as needed at home.  She is encouraged to follow-up with her doctor.  Additionally she is instructed to return to the emergency department for any new or worsening symptoms.       Kristine Rowley,   11/13/23 [id number]

## 2023-11-13 NOTE — TELEPHONE ENCOUNTER
----- Message from Marva Stokes MD sent at 11/13/2023  2:15 PM EST -----  Urine culture negative. (1 of 3 results)

## 2023-11-13 NOTE — TELEPHONE ENCOUNTER
----- Message from Marva Stokes MD sent at 11/13/2023  2:14 PM EST -----  Jamar Diane- review of blood work shows markers continue to be high for inflammation, meaning the prednisone should help and would follow up with your rheumatologist. Hope you are feeling better.

## 2023-11-13 NOTE — RESULT ENCOUNTER NOTE
Jamar Diane- review of blood work shows markers continue to be high for inflammation, meaning the prednisone should help and would follow up with your rheumatologist. Hope you are feeling better.

## 2023-11-13 NOTE — TELEPHONE ENCOUNTER
----- Message from Marva Stokes MD sent at 11/13/2023  2:16 PM EST -----  Unchanged chest -ray but showed elevated diaphragm on left which would explain the decreased breath sounds.

## 2023-12-14 ENCOUNTER — OFFICE VISIT (OUTPATIENT)
Dept: PRIMARY CARE | Facility: CLINIC | Age: 83
End: 2023-12-14
Payer: MEDICARE

## 2023-12-14 VITALS
BODY MASS INDEX: 29.23 KG/M2 | WEIGHT: 165 LBS | HEIGHT: 63 IN | HEART RATE: 64 BPM | SYSTOLIC BLOOD PRESSURE: 110 MMHG | DIASTOLIC BLOOD PRESSURE: 64 MMHG

## 2023-12-14 DIAGNOSIS — M35.3 PMR (POLYMYALGIA RHEUMATICA) (MULTI): ICD-10-CM

## 2023-12-14 DIAGNOSIS — E55.9 VITAMIN D DEFICIENCY: ICD-10-CM

## 2023-12-14 DIAGNOSIS — M05.741 RHEUMATOID ARTHRITIS INVOLVING BOTH HANDS WITH POSITIVE RHEUMATOID FACTOR (MULTI): ICD-10-CM

## 2023-12-14 DIAGNOSIS — M79.7 CHRONIC FATIGUE SYNDROME WITH FIBROMYALGIA: ICD-10-CM

## 2023-12-14 DIAGNOSIS — B02.30 HERPES ZOSTER OPHTHALMICUS OF RIGHT EYE: ICD-10-CM

## 2023-12-14 DIAGNOSIS — M05.742 RHEUMATOID ARTHRITIS INVOLVING BOTH HANDS WITH POSITIVE RHEUMATOID FACTOR (MULTI): ICD-10-CM

## 2023-12-14 DIAGNOSIS — G93.32 CHRONIC FATIGUE SYNDROME WITH FIBROMYALGIA: ICD-10-CM

## 2023-12-14 DIAGNOSIS — M35.02: ICD-10-CM

## 2023-12-14 DIAGNOSIS — Z00.00 MEDICARE ANNUAL WELLNESS VISIT, SUBSEQUENT: Primary | ICD-10-CM

## 2023-12-14 DIAGNOSIS — M06.4 POLYARTHROPATHY, INFLAMMATORY (MULTI): ICD-10-CM

## 2023-12-14 DIAGNOSIS — M81.0 OSTEOPOROSIS, SENILE: ICD-10-CM

## 2023-12-14 PROBLEM — R10.9 ACUTE LEFT FLANK PAIN: Status: RESOLVED | Noted: 2023-11-08 | Resolved: 2023-12-14

## 2023-12-14 PROBLEM — M79.10 MYALGIA: Status: RESOLVED | Noted: 2023-11-08 | Resolved: 2023-12-14

## 2023-12-14 PROCEDURE — G0442 ANNUAL ALCOHOL SCREEN 15 MIN: HCPCS | Performed by: INTERNAL MEDICINE

## 2023-12-14 PROCEDURE — 99214 OFFICE O/P EST MOD 30 MIN: CPT | Performed by: INTERNAL MEDICINE

## 2023-12-14 PROCEDURE — 1160F RVW MEDS BY RX/DR IN RCRD: CPT | Performed by: INTERNAL MEDICINE

## 2023-12-14 PROCEDURE — 1036F TOBACCO NON-USER: CPT | Performed by: INTERNAL MEDICINE

## 2023-12-14 PROCEDURE — 99497 ADVNCD CARE PLAN 30 MIN: CPT | Performed by: INTERNAL MEDICINE

## 2023-12-14 PROCEDURE — G0444 DEPRESSION SCREEN ANNUAL: HCPCS | Performed by: INTERNAL MEDICINE

## 2023-12-14 PROCEDURE — 1170F FXNL STATUS ASSESSED: CPT | Performed by: INTERNAL MEDICINE

## 2023-12-14 PROCEDURE — 1125F AMNT PAIN NOTED PAIN PRSNT: CPT | Performed by: INTERNAL MEDICINE

## 2023-12-14 PROCEDURE — G0439 PPPS, SUBSEQ VISIT: HCPCS | Performed by: INTERNAL MEDICINE

## 2023-12-14 PROCEDURE — 1159F MED LIST DOCD IN RCRD: CPT | Performed by: INTERNAL MEDICINE

## 2023-12-14 RX ORDER — METHOTREXATE 2.5 MG/1
12.5 TABLET ORAL
Qty: 255 TABLET | Refills: 3
Start: 2023-12-14 | End: 2024-12-13

## 2023-12-14 ASSESSMENT — ANXIETY QUESTIONNAIRES
GAD7 TOTAL SCORE: 0
2. NOT BEING ABLE TO STOP OR CONTROL WORRYING: NOT AT ALL
3. WORRYING TOO MUCH ABOUT DIFFERENT THINGS: NOT AT ALL
4. TROUBLE RELAXING: NOT AT ALL
IF YOU CHECKED OFF ANY PROBLEMS ON THIS QUESTIONNAIRE, HOW DIFFICULT HAVE THESE PROBLEMS MADE IT FOR YOU TO DO YOUR WORK, TAKE CARE OF THINGS AT HOME, OR GET ALONG WITH OTHER PEOPLE: NOT DIFFICULT AT ALL
1. FEELING NERVOUS, ANXIOUS, OR ON EDGE: NOT AT ALL
7. FEELING AFRAID AS IF SOMETHING AWFUL MIGHT HAPPEN: NOT AT ALL
6. BECOMING EASILY ANNOYED OR IRRITABLE: NOT AT ALL
5. BEING SO RESTLESS THAT IT IS HARD TO SIT STILL: NOT AT ALL

## 2023-12-14 ASSESSMENT — ACTIVITIES OF DAILY LIVING (ADL)
DRESSING: INDEPENDENT
DOING_HOUSEWORK: INDEPENDENT
MANAGING_FINANCES: INDEPENDENT
BATHING: INDEPENDENT
GROCERY_SHOPPING: INDEPENDENT
TAKING_MEDICATION: INDEPENDENT

## 2023-12-14 ASSESSMENT — PATIENT HEALTH QUESTIONNAIRE - PHQ9
2. FEELING DOWN, DEPRESSED OR HOPELESS: NOT AT ALL
1. LITTLE INTEREST OR PLEASURE IN DOING THINGS: NOT AT ALL
SUM OF ALL RESPONSES TO PHQ9 QUESTIONS 1 AND 2: 0

## 2023-12-14 ASSESSMENT — ENCOUNTER SYMPTOMS
DEPRESSION: 0
OCCASIONAL FEELINGS OF UNSTEADINESS: 0
LOSS OF SENSATION IN FEET: 0

## 2023-12-14 NOTE — PROGRESS NOTES
"Subjective   Reason for Visit: Roxi Barrios is an 83 y.o. female here for a Medicare Wellness visit.          Reviewed all medications by prescribing practitioner or clinical pharmacist (such as prescriptions, OTCs, herbal therapies and supplements) and documented in the medical record.    HPI    Patient Care Team:  Raji Freedman DO as PCP - General     Review of Systems    Objective   Vitals:  /64 (BP Location: Left arm, Patient Position: Sitting)   Pulse 64   Ht 1.6 m (5' 3\")   Wt 74.8 kg (165 lb)   BMI 29.23 kg/m²       Physical Exam    Assessment/Plan   Problem List Items Addressed This Visit       Chronic fatigue syndrome with fibromyalgia    Disorder of lung with Sjogren's syndrome (CMS/HCC)    Rheumatoid arthritis (CMS/[facility])    PMR (polymyalgia rheumatica) (CMS/[facility])    Vitamin D deficiency    Medicare annual wellness visit, subsequent - Primary     Other Visit Diagnoses       Polyarthropathy, inflammatory (CMS/[facility])                   "

## 2023-12-14 NOTE — ASSESSMENT & PLAN NOTE
Up-to-date with vaccinations and screenings at this time discussed advanced medical directives and given paperwork to discuss with nearest caregiver which she needs to update since her  passed away

## 2023-12-14 NOTE — ASSESSMENT & PLAN NOTE
Elevated inflammatory levels continue methotrexate 2.5 mg 5 tablets weekly follows with rheumatologist in 3 months

## 2023-12-14 NOTE — ASSESSMENT & PLAN NOTE
Continue acute treatment and monitoring with ophthalmologist at this time on acyclovir also use an moxifloxacin eyedrops and ocular steroids highly recommend zoster vaccination to be completed within the next 6 months

## 2023-12-14 NOTE — PROGRESS NOTES
"Subjective   Reason for Visit: Roxi Barrios is an 83 y.o. female here for a Medicare Wellness visit.     Past Medical, Surgical, and Family History reviewed and updated in chart.    Reviewed all medications by prescribing practitioner or clinical pharmacist (such as prescriptions, OTCs, herbal therapies and supplements) and documented in the medical record.    HPI    Patient Care Team:  Raji Freedman, DO as PCP - General     Review of Systems   All other systems reviewed and are negative.  Alcohol consumption becomes hazardous when consuming women [place] over 65 years old greater than 7 drinks per week or greater than 3 drinks per occasion for men greater than 14 drinks per week or greater than 4 drinks per occasion.  I spent 15 minutes screening for alcohol use.Depression and anxiety screening completed   PHQ9 score   GAD7 score   I spent 15 minutes obtaining and discussing depression screening using PHQ 2 questions with results documented in chart.  Screening using PHQ-9 and JESSICA-7 scores were used for follow-up with treatment and referral plan discussed.      I spent greater than 15 minutes discussing advance care planning including the explanation and discussion of advanced directives.  If patient does not have current up-to-date documents examples and information provided on how to create both living will and power of .  toolkit was given to patient and was encouraged to work out completing these documents.    Objective   Vitals:  /64 (BP Location: Left arm, Patient Position: Sitting)   Pulse 64   Ht 1.6 m (5' 3\")   Wt 74.8 kg (165 lb)   BMI 29.23 kg/m²       Physical Exam  Vitals and nursing note reviewed.   Constitutional:       General: She is not in acute distress.     Appearance: Normal appearance. She is well-developed. She is not toxic-appearing.   HENT:      Head: Normocephalic and atraumatic.      Right Ear: Tympanic membrane and external ear normal.      Left Ear: Tympanic membrane " and external ear normal.      Nose: Nose normal.      Mouth/Throat:      Mouth: Mucous membranes are moist.      Pharynx: Oropharynx is clear. No oropharyngeal exudate or posterior oropharyngeal erythema.      Tonsils: No tonsillar exudate. 2+ on the right. 2+ on the left.   Eyes:      Extraocular Movements: Extraocular movements intact.      Conjunctiva/sclera: Conjunctivae normal.   Cardiovascular:      Rate and Rhythm: Normal rate and regular rhythm.      Pulses: Normal pulses.      Heart sounds: Normal heart sounds. No murmur heard.  Pulmonary:      Effort: Pulmonary effort is normal.      Breath sounds: Normal breath sounds.   Abdominal:      General: Abdomen is flat. Bowel sounds are normal.      Palpations: Abdomen is soft.   Musculoskeletal:      Cervical back: Neck supple.   Lymphadenopathy:      Cervical: No cervical adenopathy.   Skin:     General: Skin is warm and dry.      Findings: No rash.   Neurological:      Mental Status: She is alert. Mental status is at baseline.   Psychiatric:         Mood and Affect: Mood normal.         Behavior: Behavior normal.         Thought Content: Thought content normal.         Judgment: Judgment normal.         Assessment/Plan   Problem List Items Addressed This Visit       Chronic fatigue syndrome with fibromyalgia    Current Assessment & Plan     Encourage patient to engage in outside activities and social activities to combat loneliness and to increase exercise capacity look into community recreation services for walking dog recently passed away contemplating whether to get another pet also thinking about assisted care given that she lives alone must look into financial resources continue to follow closely         Relevant Orders    Follow Up In [facility] - PCP - Established    Disorder of lung with Sjogren's syndrome (CMS/HCC)    Current Assessment & Plan     Stable without exacerbations at this time continue to monitor with rheumatologist          Relevant Orders    Follow Up In [facility] - PCP - Established    Herpes zoster ophthalmicus of right eye    Current Assessment & Plan     Continue acute treatment and monitoring with ophthalmologist at this time on acyclovir also use an moxifloxacin eyedrops and ocular steroids highly recommend zoster vaccination to be completed within the next 6 months         Rheumatoid arthritis (CMS/[facility])    Current Assessment & Plan     Elevated inflammatory levels continue methotrexate 2.5 mg 5 tablets weekly follows with rheumatologist in 3 months         Relevant Medications    methotrexate (Trexall) 2.5 mg tablet    Other Relevant Orders    Follow Up In [facility] - PCP - Established    Osteoporosis, senile    Current Assessment & Plan     Density monitored and managed by rheumatologist will review last bone density scan reevaluate DEXA scan completed May 2022 and reevaluate next visit for 2-year follow-up patient's bone density in 2020 was favorable improving bone density to osteopenic levels at -2.2 in spine and hip continue alendronate and vitamin D replacement         PMR (polymyalgia rheumatica) (CMS/[facility])    Current Assessment & Plan     Elevated sed rate CRP levels related to rheumatoid arthritis could benefit from low-dose prednisone attempting to reduce prednisone burden due to risk of osteoporosis and increased fracture continue follow-up with rheumatologist on methotrexate symptoms are stable at this time clinically         Relevant Orders    Follow Up In [facility] - PCP - Established    Vitamin D deficiency    Current Assessment & Plan     Continue on alendronate 70 mg weekly with vitamin D supplementationReevaluate vitamin D with next visit         Relevant Orders    Follow Up In [facility] - PCP - Established    Medicare annual wellness visit, subsequent - Primary    Current Assessment & Plan     Up-to-date with vaccinations and screenings at this time discussed  advanced medical directives and given paperwork to discuss with nearest caregiver which she needs to update since her  passed away         Relevant Orders    Follow Up In [facility] - PCP - Established     Other Visit Diagnoses       Polyarthropathy, inflammatory (CMS/HCC)        Relevant Orders    Follow Up In [facility] - PCP - Established

## 2023-12-14 NOTE — ASSESSMENT & PLAN NOTE
Elevated sed rate CRP levels related to rheumatoid arthritis could benefit from low-dose prednisone attempting to reduce prednisone burden due to risk of osteoporosis and increased fracture continue follow-up with rheumatologist on methotrexate symptoms are stable at this time clinically

## 2023-12-14 NOTE — ASSESSMENT & PLAN NOTE
Density monitored and managed by rheumatologist will review last bone density scan reevaluate DEXA scan completed May 2022 and reevaluate next visit for 2-year follow-up patient's bone density in 2020 was favorable improving bone density to osteopenic levels at -2.2 in spine and hip continue alendronate and vitamin D replacement

## 2023-12-14 NOTE — ASSESSMENT & PLAN NOTE
Continue on alendronate 70 mg weekly with vitamin D supplementationReevaluate vitamin D with next visit

## 2023-12-14 NOTE — ASSESSMENT & PLAN NOTE
Encourage patient to engage in outside activities and social activities to combat loneliness and to increase exercise capacity look into community recreation services for walking dog recently passed away contemplating whether to get another pet also thinking about assisted care given that she lives alone must look into financial resources continue to follow closely

## 2024-02-12 ENCOUNTER — TELEPHONE (OUTPATIENT)
Dept: PRIMARY CARE | Facility: CLINIC | Age: 84
End: 2024-02-12
Payer: MEDICARE

## 2024-02-12 NOTE — TELEPHONE ENCOUNTER
Chief Complaint :    Symptoms: pain in both ears     Duration: weeks     Treatments:    Patient Requesting: Appointment     Did the Patient have the covid Vaccine:    Pharmacy:    Covid Tested:

## 2024-02-13 ENCOUNTER — OFFICE VISIT (OUTPATIENT)
Dept: PRIMARY CARE | Facility: CLINIC | Age: 84
End: 2024-02-13
Payer: MEDICARE

## 2024-02-13 VITALS
HEIGHT: 63 IN | WEIGHT: 163 LBS | HEART RATE: 66 BPM | BODY MASS INDEX: 28.88 KG/M2 | DIASTOLIC BLOOD PRESSURE: 64 MMHG | SYSTOLIC BLOOD PRESSURE: 110 MMHG

## 2024-02-13 DIAGNOSIS — M35.02: ICD-10-CM

## 2024-02-13 DIAGNOSIS — M35.3 PMR (POLYMYALGIA RHEUMATICA) (MULTI): ICD-10-CM

## 2024-02-13 DIAGNOSIS — G93.32 CHRONIC FATIGUE SYNDROME WITH FIBROMYALGIA: ICD-10-CM

## 2024-02-13 DIAGNOSIS — H61.22 HEARING LOSS OF LEFT EAR DUE TO CERUMEN IMPACTION: Primary | ICD-10-CM

## 2024-02-13 DIAGNOSIS — M79.7 CHRONIC FATIGUE SYNDROME WITH FIBROMYALGIA: ICD-10-CM

## 2024-02-13 PROCEDURE — 1125F AMNT PAIN NOTED PAIN PRSNT: CPT | Performed by: INTERNAL MEDICINE

## 2024-02-13 PROCEDURE — 1160F RVW MEDS BY RX/DR IN RCRD: CPT | Performed by: INTERNAL MEDICINE

## 2024-02-13 PROCEDURE — 1159F MED LIST DOCD IN RCRD: CPT | Performed by: INTERNAL MEDICINE

## 2024-02-13 PROCEDURE — 1036F TOBACCO NON-USER: CPT | Performed by: INTERNAL MEDICINE

## 2024-02-13 PROCEDURE — 99213 OFFICE O/P EST LOW 20 MIN: CPT | Performed by: INTERNAL MEDICINE

## 2024-02-13 PROCEDURE — 69210 REMOVE IMPACTED EAR WAX UNI: CPT | Performed by: INTERNAL MEDICINE

## 2024-02-13 RX ORDER — PREDNISOLONE ACETATE 10 MG/ML
SUSPENSION/ DROPS OPHTHALMIC
COMMUNITY
Start: 2024-02-09

## 2024-02-13 ASSESSMENT — ENCOUNTER SYMPTOMS
ARTHRALGIAS: 1
MYALGIAS: 1
FATIGUE: 1
RHINORRHEA: 1

## 2024-02-13 NOTE — ASSESSMENT & PLAN NOTE
Patient with underlying chronic pain and depression related to fibromyalgia chronic fatigue dog recently passed away lives alone could benefit from assisted living to improve environment in the future.  Offered pain management referral and offered treatment of fibromyalgia and concomitant depression with duloxetine with consideration for pregabalin patient will like to think about this and does not want us to add a further medications at this time strongly urged patient to follow-up with rheumatologist and physical therapy continue to monitor consider referral to pain management

## 2024-02-13 NOTE — ASSESSMENT & PLAN NOTE
Patient with multiple osteoarthritis complaints overall just does not feel well physical therapy referral given to patient by rheumatologist would highly recommend

## 2024-02-13 NOTE — PROGRESS NOTES
"Subjective   Patient ID: Roxi Barrios is a 84 y.o. female who presents for Earache (Bilateral ear pain: left ear has been going on for months, right ear started about 1 week ago ears feel like there is something in them and can not get it out).    HPI     Review of Systems    Objective   Ht 1.6 m (5' 3\")   Wt 73.9 kg (163 lb)   BMI 28.87 kg/m²     Physical Exam    Assessment/Plan          "

## 2024-02-13 NOTE — ASSESSMENT & PLAN NOTE
Patient blood work to reevaluate sed rate and CRP levels with rheumatologist acute evaluated last week continue on methotrexate

## 2024-02-13 NOTE — ASSESSMENT & PLAN NOTE
Irrigate left ear continue to monitor closely continue with treatment of allergic and nonallergic rhinitis no evidence to suggest infection or otitis at this time

## 2024-02-13 NOTE — PROGRESS NOTES
"Subjective   Reason for Visit: Roxi Barrios is an 84 y.o. female here for aacute visit.     Past Medical, Surgical, and Family History reviewed and updated in chart.    Reviewed all medications by prescribing practitioner or clinical pharmacist (such as prescriptions, OTCs, herbal therapies and supplements) and documented in the medical record.    Earache   There is pain in both ears. This is a chronic problem. The current episode started more than 1 month ago. The problem occurs every few minutes. The problem has been waxing and waning. There has been no fever. Associated symptoms include hearing loss and rhinorrhea. She has tried nothing for the symptoms. The treatment provided no relief. Her past medical history is significant for hearing loss.       Patient Care Team:  Raji Freedman DO as PCP - General     Review of Systems   Constitutional:  Positive for fatigue.   HENT:  Positive for ear pain, hearing loss and rhinorrhea.    Musculoskeletal:  Positive for arthralgias and myalgias.       Objective   Vitals:  /64 (BP Location: Right arm)   Pulse 66   Ht 1.6 m (5' 3\")   Wt 73.9 kg (163 lb)   BMI 28.87 kg/m²       Physical Exam  Vitals and nursing note reviewed.   Constitutional:       General: She is not in acute distress.     Appearance: Normal appearance. She is well-developed. She is not toxic-appearing.   HENT:      Head: Normocephalic and atraumatic.      Right Ear: Tympanic membrane and external ear normal.      Left Ear: Tympanic membrane and external ear normal. There is impacted cerumen.      Nose: Nose normal.      Mouth/Throat:      Mouth: Mucous membranes are moist.      Pharynx: Oropharynx is clear. No oropharyngeal exudate or posterior oropharyngeal erythema.      Tonsils: No tonsillar exudate. 2+ on the right. 2+ on the left.   Eyes:      Extraocular Movements: Extraocular movements intact.      Conjunctiva/sclera: Conjunctivae normal.   Cardiovascular:      Rate and Rhythm: Normal " rate and regular rhythm.      Pulses: Normal pulses.      Heart sounds: Normal heart sounds. No murmur heard.  Pulmonary:      Effort: Pulmonary effort is normal.      Breath sounds: Normal breath sounds.   Abdominal:      General: Abdomen is flat. Bowel sounds are normal.      Palpations: Abdomen is soft.   Musculoskeletal:      Cervical back: Neck supple.   Lymphadenopathy:      Cervical: No cervical adenopathy.   Skin:     General: Skin is warm and dry.      Findings: No rash.   Neurological:      Mental Status: She is alert. Mental status is at baseline.   Psychiatric:         Mood and Affect: Mood normal.         Behavior: Behavior normal.         Thought Content: Thought content normal.         Judgment: Judgment normal.         Assessment/Plan   Problem List Items Addressed This Visit       Chronic fatigue syndrome with fibromyalgia    Current Assessment & Plan     Patient with underlying chronic pain and depression related to fibromyalgia chronic fatigue dog recently passed away lives alone could benefit from assisted living to improve environment in the future.  Offered pain management referral and offered treatment of fibromyalgia and concomitant depression with duloxetine with consideration for pregabalin patient will like to think about this and does not want us to add a further medications at this time strongly urged patient to follow-up with rheumatologist and physical therapy continue to monitor consider referral to pain management         Disorder of lung with Sjogren's syndrome (CMS/Formerly [facility] [place])    Current Assessment & Plan     Stable chronic follows with rheumatologist continue methotrexate weekly         PMR (polymyalgia rheumatica) (CMS/[facility] [place])    Current Assessment & Plan     Patient blood work to reevaluate sed rate and CRP levels with rheumatologist acute evaluated last week continue on methotrexate         Hearing loss of left ear due to cerumen impaction - Primary    Current Assessment & Plan      Irrigate left ear continue to monitor closely continue with treatment of allergic and nonallergic rhinitis no evidence to suggest infection or otitis at this time

## 2024-02-21 ENCOUNTER — LAB (OUTPATIENT)
Dept: LAB | Facility: LAB | Age: 84
End: 2024-02-21
Payer: MEDICARE

## 2024-02-21 DIAGNOSIS — M45.2 ANKYLOSING SPONDYLITIS OF CERVICAL REGION (MULTI): Primary | ICD-10-CM

## 2024-02-21 LAB
ALBUMIN SERPL BCP-MCNC: 4 G/DL (ref 3.4–5)
ALP SERPL-CCNC: 80 U/L (ref 33–136)
ALT SERPL W P-5'-P-CCNC: 23 U/L (ref 7–45)
ANION GAP SERPL CALC-SCNC: 13 MMOL/L (ref 10–20)
AST SERPL W P-5'-P-CCNC: 33 U/L (ref 9–39)
BASOPHILS # BLD AUTO: 0.05 X10*3/UL (ref 0–0.1)
BASOPHILS NFR BLD AUTO: 0.9 %
BILIRUB SERPL-MCNC: 0.5 MG/DL (ref 0–1.2)
BUN SERPL-MCNC: 14 MG/DL (ref 6–23)
CALCIUM SERPL-MCNC: 9.2 MG/DL (ref 8.6–10.3)
CHLORIDE SERPL-SCNC: 104 MMOL/L (ref 98–107)
CO2 SERPL-SCNC: 24 MMOL/L (ref 21–32)
CREAT SERPL-MCNC: 0.88 MG/DL (ref 0.5–1.05)
CRP SERPL-MCNC: 0.68 MG/DL
EGFRCR SERPLBLD CKD-EPI 2021: 65 ML/MIN/1.73M*2
EOSINOPHIL # BLD AUTO: 0.13 X10*3/UL (ref 0–0.4)
EOSINOPHIL NFR BLD AUTO: 2.4 %
ERYTHROCYTE [DISTWIDTH] IN BLOOD BY AUTOMATED COUNT: 14.9 % (ref 11.5–14.5)
ERYTHROCYTE [SEDIMENTATION RATE] IN BLOOD BY WESTERGREN METHOD: 37 MM/H (ref 0–30)
GLUCOSE SERPL-MCNC: 83 MG/DL (ref 74–99)
HCT VFR BLD AUTO: 42.1 % (ref 36–46)
HGB BLD-MCNC: 13.9 G/DL (ref 12–16)
IMM GRANULOCYTES # BLD AUTO: 0.01 X10*3/UL (ref 0–0.5)
IMM GRANULOCYTES NFR BLD AUTO: 0.2 % (ref 0–0.9)
LYMPHOCYTES # BLD AUTO: 1.48 X10*3/UL (ref 0.8–3)
LYMPHOCYTES NFR BLD AUTO: 27.2 %
MCH RBC QN AUTO: 33 PG (ref 26–34)
MCHC RBC AUTO-ENTMCNC: 33 G/DL (ref 32–36)
MCV RBC AUTO: 100 FL (ref 80–100)
MONOCYTES # BLD AUTO: 0.4 X10*3/UL (ref 0.05–0.8)
MONOCYTES NFR BLD AUTO: 7.4 %
NEUTROPHILS # BLD AUTO: 3.37 X10*3/UL (ref 1.6–5.5)
NEUTROPHILS NFR BLD AUTO: 61.9 %
NRBC BLD-RTO: 0 /100 WBCS (ref 0–0)
PLATELET # BLD AUTO: 245 X10*3/UL (ref 150–450)
POTASSIUM SERPL-SCNC: 4.1 MMOL/L (ref 3.5–5.3)
PROT SERPL-MCNC: 7 G/DL (ref 6.4–8.2)
RBC # BLD AUTO: 4.21 X10*6/UL (ref 4–5.2)
SODIUM SERPL-SCNC: 137 MMOL/L (ref 136–145)
WBC # BLD AUTO: 5.4 X10*3/UL (ref 4.4–11.3)

## 2024-02-21 PROCEDURE — 85025 COMPLETE CBC W/AUTO DIFF WBC: CPT

## 2024-02-21 PROCEDURE — 85652 RBC SED RATE AUTOMATED: CPT

## 2024-02-21 PROCEDURE — 86140 C-REACTIVE PROTEIN: CPT

## 2024-02-21 PROCEDURE — 36415 COLL VENOUS BLD VENIPUNCTURE: CPT

## 2024-02-21 PROCEDURE — 80053 COMPREHEN METABOLIC PANEL: CPT

## 2024-05-15 ENCOUNTER — LAB REQUISITION (OUTPATIENT)
Dept: LAB | Facility: HOSPITAL | Age: 84
End: 2024-05-15
Payer: MEDICARE

## 2024-05-15 ENCOUNTER — HOSPITAL ENCOUNTER (OUTPATIENT)
Dept: RADIOLOGY | Facility: EXTERNAL LOCATION | Age: 84
Discharge: HOME | End: 2024-05-15

## 2024-05-15 DIAGNOSIS — R53.83 OTHER FATIGUE: ICD-10-CM

## 2024-05-15 DIAGNOSIS — R05.9 COUGH, UNSPECIFIED TYPE: ICD-10-CM

## 2024-05-15 PROCEDURE — 87086 URINE CULTURE/COLONY COUNT: CPT

## 2024-05-17 LAB — BACTERIA UR CULT: NO GROWTH

## 2024-05-28 ENCOUNTER — HOSPITAL ENCOUNTER (OUTPATIENT)
Dept: RADIOLOGY | Facility: EXTERNAL LOCATION | Age: 84
Discharge: HOME | End: 2024-05-28
Payer: MEDICARE

## 2024-05-28 DIAGNOSIS — R05.9 COUGH, UNSPECIFIED TYPE: ICD-10-CM

## 2024-06-14 ENCOUNTER — APPOINTMENT (OUTPATIENT)
Dept: PRIMARY CARE | Facility: CLINIC | Age: 84
End: 2024-06-14
Payer: MEDICARE

## 2024-06-14 VITALS
BODY MASS INDEX: 28.17 KG/M2 | SYSTOLIC BLOOD PRESSURE: 120 MMHG | HEART RATE: 62 BPM | WEIGHT: 159 LBS | DIASTOLIC BLOOD PRESSURE: 70 MMHG | HEIGHT: 63 IN

## 2024-06-14 DIAGNOSIS — M35.02: ICD-10-CM

## 2024-06-14 DIAGNOSIS — M35.3 PMR (POLYMYALGIA RHEUMATICA) (MULTI): ICD-10-CM

## 2024-06-14 DIAGNOSIS — M81.0 OSTEOPOROSIS, SENILE: ICD-10-CM

## 2024-06-14 DIAGNOSIS — M79.7 CHRONIC FATIGUE SYNDROME WITH FIBROMYALGIA: ICD-10-CM

## 2024-06-14 DIAGNOSIS — M06.4 POLYARTHROPATHY, INFLAMMATORY (MULTI): ICD-10-CM

## 2024-06-14 DIAGNOSIS — G93.32 CHRONIC FATIGUE SYNDROME WITH FIBROMYALGIA: ICD-10-CM

## 2024-06-14 DIAGNOSIS — J30.2 PERENNIAL ALLERGIC RHINITIS WITH SEASONAL VARIATION: Primary | ICD-10-CM

## 2024-06-14 DIAGNOSIS — M05.741 RHEUMATOID ARTHRITIS INVOLVING BOTH HANDS WITH POSITIVE RHEUMATOID FACTOR (MULTI): ICD-10-CM

## 2024-06-14 DIAGNOSIS — J30.89 PERENNIAL ALLERGIC RHINITIS WITH SEASONAL VARIATION: Primary | ICD-10-CM

## 2024-06-14 DIAGNOSIS — E55.9 VITAMIN D DEFICIENCY: ICD-10-CM

## 2024-06-14 DIAGNOSIS — B02.30 HERPES ZOSTER OPHTHALMICUS OF RIGHT EYE: ICD-10-CM

## 2024-06-14 DIAGNOSIS — M05.742 RHEUMATOID ARTHRITIS INVOLVING BOTH HANDS WITH POSITIVE RHEUMATOID FACTOR (MULTI): ICD-10-CM

## 2024-06-14 PROBLEM — H61.22 HEARING LOSS OF LEFT EAR DUE TO CERUMEN IMPACTION: Status: RESOLVED | Noted: 2024-02-13 | Resolved: 2024-06-14

## 2024-06-14 PROCEDURE — 1036F TOBACCO NON-USER: CPT | Performed by: INTERNAL MEDICINE

## 2024-06-14 PROCEDURE — 1123F ACP DISCUSS/DSCN MKR DOCD: CPT | Performed by: INTERNAL MEDICINE

## 2024-06-14 PROCEDURE — 1159F MED LIST DOCD IN RCRD: CPT | Performed by: INTERNAL MEDICINE

## 2024-06-14 PROCEDURE — 1160F RVW MEDS BY RX/DR IN RCRD: CPT | Performed by: INTERNAL MEDICINE

## 2024-06-14 PROCEDURE — 1158F ADVNC CARE PLAN TLK DOCD: CPT | Performed by: INTERNAL MEDICINE

## 2024-06-14 PROCEDURE — 99213 OFFICE O/P EST LOW 20 MIN: CPT | Performed by: INTERNAL MEDICINE

## 2024-06-14 ASSESSMENT — VISUAL ACUITY: OU: 1

## 2024-06-14 NOTE — PROGRESS NOTES
"Subjective   Reason for Visit: Roxi Barrios is an 84 y.o. female here for a fu  visit.     Past Medical, Surgical, and Family History reviewed and updated in chart.    Reviewed all medications by prescribing practitioner or clinical pharmacist (such as prescriptions, OTCs, herbal therapies and supplements) and documented in the medical record.    HPI    Patient Care Team:  Raji Freedman DO as PCP - General  Raji Freedman DO as PCP - [facility] Attributed Provider     Review of Systems   Eyes:  Positive for visual disturbance.   All other systems reviewed and are negative.      Objective   Vitals:  /70 (BP Location: Left arm)   Pulse 62   Ht 1.6 m (5' 3\")   Wt 72.1 kg (159 lb)   BMI 28.17 kg/m²       Physical Exam  Vitals and nursing note reviewed.   Constitutional:       General: She is not in acute distress.     Appearance: Normal appearance. She is well-developed. She is not toxic-appearing.   HENT:      Head: Normocephalic and atraumatic.      Right Ear: Tympanic membrane and external ear normal.      Left Ear: Tympanic membrane and external ear normal.      Nose: Nose normal.      Mouth/Throat:      Mouth: Mucous membranes are moist.      Pharynx: Oropharynx is clear. No oropharyngeal exudate or posterior oropharyngeal erythema.      Tonsils: No tonsillar exudate. 2+ on the right. 2+ on the left.   Eyes:      General: Lids are normal. Lids are everted, no foreign bodies appreciated. Vision grossly intact. Gaze aligned appropriately. Visual field deficit present.      Extraocular Movements: Extraocular movements intact.      Conjunctiva/sclera: Conjunctivae normal.      Comments: Chronic zoster oph right eye affecting vision    Cardiovascular:      Rate and Rhythm: Normal rate and regular rhythm.      Pulses: Normal pulses.      Heart sounds: Normal heart sounds. No murmur heard.  Pulmonary:      Effort: Pulmonary effort is normal.      Breath sounds: Examination of the right-lower field " reveals decreased breath sounds and rhonchi. Examination of the left-lower field reveals decreased breath sounds. Decreased breath sounds and rhonchi present.      Comments: Restrictive lung with dec breaths sounds bases  Chronic dry crackles right lower lung stable   Abdominal:      General: Abdomen is flat. Bowel sounds are normal.      Palpations: Abdomen is soft.   Musculoskeletal:      Cervical back: Neck supple.   Lymphadenopathy:      Cervical: No cervical adenopathy.   Skin:     General: Skin is warm and dry.      Findings: No rash.   Neurological:      Mental Status: She is alert. Mental status is at baseline.   Psychiatric:         Mood and Affect: Mood normal.         Behavior: Behavior normal.         Thought Content: Thought content normal.         Judgment: Judgment normal.         Assessment/Plan   Problem List Items Addressed This Visit       Chronic fatigue syndrome with fibromyalgia    Current Assessment & Plan     Continue with regular activity as tolerated adequate sleep for response with antidepressant medication         Relevant Orders    Follow Up In [facility] - PCP - Established    Disorder of lung with Sjogren's syndrome (Multi)    Current Assessment & Plan     Continue with methotrexate 12.5 mg oral weekly         Relevant Orders    Follow Up [facility] - PCP - Established    Herpes zoster ophthalmicus of right eye    Current Assessment & Plan     Patient with continued visual field deficit of right eye follow-up with ophthalmology continue acyclovir and prednisolone ophthalmic drops along with Zirgan prescribed by ophthalmologist         Rheumatoid arthritis (Multi)    Relevant Orders    Follow Up [facility] - PCP - Established    Osteoporosis, senile    Current Assessment & Plan     Continues alendronate 70 mg weekly bone density ordered by rheumatologist         Relevant Orders    Follow Up [facility] - PCP - Established    Perennial  allergic rhinitis with seasonal variation - Primary    Current Assessment & Plan     Continue current treatment         Relevant Orders    Follow Up [facility] - PCP - Established    PMR (polymyalgia rheumatica) (Multi)    Current Assessment & Plan     Stable sed rate and CRP levels at this time         Relevant Orders    Follow Up [facility] - PCP - Established    Vitamin D deficiency    Current Assessment & Plan     Vitamin D levels optimal continue alendronate 70 mg weekly         Relevant Orders    Follow Up [facility] - PCP - Established     Other Visit Diagnoses       Polyarthropathy, inflammatory (Multi)        Relevant Orders    Follow Up In [facility] - PCP - Established

## 2024-06-14 NOTE — ASSESSMENT & PLAN NOTE
Continue with regular activity as tolerated adequate sleep for response with antidepressant medication

## 2024-06-14 NOTE — ASSESSMENT & PLAN NOTE
Patient with continued visual field deficit of right eye follow-up with ophthalmology continue acyclovir and prednisolone ophthalmic drops along with Zirgan prescribed by ophthalmologist

## 2024-06-14 NOTE — PROGRESS NOTES
"Subjective   Patient ID: Roxi Barrios is a 84 y.o. female who presents for Follow-up.    HPI     Review of Systems    Objective   /70 (BP Location: Left arm)   Pulse 62   Ht 1.6 m (5' 3\")   Wt 72.1 kg (159 lb)   BMI 28.17 kg/m²     Physical Exam    Assessment/Plan          "

## 2024-09-27 ENCOUNTER — OFFICE VISIT (OUTPATIENT)
Dept: URGENT CARE | Age: 84
End: 2024-09-27
Payer: MEDICARE

## 2024-09-27 VITALS
TEMPERATURE: 98.2 F | OXYGEN SATURATION: 96 % | HEART RATE: 92 BPM | DIASTOLIC BLOOD PRESSURE: 77 MMHG | SYSTOLIC BLOOD PRESSURE: 140 MMHG | RESPIRATION RATE: 20 BRPM

## 2024-09-27 DIAGNOSIS — M25.512 ACUTE PAIN OF LEFT SHOULDER: Primary | ICD-10-CM

## 2024-09-27 RX ORDER — PREDNISONE 20 MG/1
20 TABLET ORAL DAILY
Qty: 5 TABLET | Refills: 0 | Status: SHIPPED | OUTPATIENT
Start: 2024-09-27 | End: 2024-10-02

## 2024-09-27 ASSESSMENT — ENCOUNTER SYMPTOMS
PALPITATIONS: 0
SHORTNESS OF BREATH: 0
CHILLS: 0
JOINT SWELLING: 0
FEVER: 0
ARTHRALGIAS: 1
NECK PAIN: 0
COUGH: 0

## 2024-09-27 NOTE — PROGRESS NOTES
Subjective   Patient ID: Roxi Barrios is a 84 y.o. female. They present today with a chief complaint of Shoulder Pain (Left shoulder pain, this morning, no injury, no swelling.).    History of Present Illness  Patient presents for Left shoulder pain. She states she woke up with this pain today. Denies any known injury or new activities yesterday.  She states her pain is posterior and lateral. She states she normally has full ROM of her left shoulder, though today is unable to move her shoulder much due to pain. DENIES: numbness, tingling, bruising, swelling, redness, rash, chest pain, cough, dyspnea or radiation of pain.   She did undergo bilateral shoulder Xray on 8/1/23, the report result showed:   No fracture identified.   Progressive degenerative changes of the left shoulder with large   overhanging marginal osteophyte arising inferiorly from the medial   aspect of the humeral head.   The acromioclavicular and coracoclavicular intervals are maintained.   TX ATTEMPTED: topical cream without relief.           Past Medical History  Allergies as of 09/27/2024 - Reviewed 09/27/2024   Allergen Reaction Noted    Clindamycin Nausea/vomiting 04/03/2017    Seldane Other 04/05/2014    Seldane-d Unknown 10/16/2023    Amoxicillin Hives 10/16/2023    Levofloxacin Other and Hives 04/03/2017    Terfenadine-pseudoephedrine Other 10/16/2023    Tetracycline hcl GI Upset 10/17/2023    Vancomycin Hives 10/16/2023       (Not in a hospital admission)       Past Medical History:   Diagnosis Date    Acute cystitis with hematuria 11/18/2021    Acute cystitis with hematuria    Other specified noninflammatory disorders of vagina 07/28/2021    Vaginal odor    Pain, unspecified 11/17/2021    Body aches    Personal history of other diseases of the musculoskeletal system and connective tissue     History of spinal stenosis    Personal history of other diseases of the musculoskeletal system and connective tissue 06/30/2020    History of back  pain    Personal history of other diseases of the musculoskeletal system and connective tissue 06/10/2020    History of bone disorder    Personal history of other diseases of the respiratory system 09/30/2020    History of pulmonary fibrosis    Personal history of other mental and behavioral disorders     History of depression    Personal history of other specified conditions 09/30/2020    History of chronic cough    Personal history of other specified conditions 01/26/2021    History of dysuria    Personal history of other specified conditions 06/13/2016    History of dyspnea       Past Surgical History:   Procedure Laterality Date    BACK SURGERY  06/13/2016    Back Surgery    BREAST LUMPECTOMY  06/13/2016    Breast Surgery Lumpectomy    CHOLECYSTECTOMY  06/08/2016    Cholecystectomy Laparoscopic    FOOT SURGERY  06/08/2016    Foot Surgery    HERNIA REPAIR  06/08/2016    Hernia Repair    TOTAL KNEE ARTHROPLASTY  06/08/2016    Knee Replacement        reports that she has quit smoking. Her smoking use included cigarettes. She has never used smokeless tobacco. She reports that she does not currently use alcohol. She reports that she does not use drugs.    Review of Systems  Review of Systems   Constitutional:  Negative for chills and fever.   Respiratory:  Negative for cough and shortness of breath.    Cardiovascular:  Negative for chest pain and palpitations.   Musculoskeletal:  Positive for arthralgias (Left shoulder). Negative for joint swelling and neck pain.   Skin:  Negative for rash.        Negative for redness and bruising.                                  Objective    Vitals:    09/27/24 1340   BP: 140/77   BP Location: Left arm   Patient Position: Sitting   BP Cuff Size: Adult   Pulse: 92   Resp: 20   Temp: 36.8 °C (98.2 °F)   SpO2: 96%     No LMP recorded.    Physical Exam  Vitals and nursing note reviewed.   Constitutional:       General: She is not in acute distress.     Appearance: Normal appearance.    Cardiovascular:      Rate and Rhythm: Normal rate and regular rhythm.      Heart sounds: Normal heart sounds.   Pulmonary:      Effort: Pulmonary effort is normal. No respiratory distress.      Breath sounds: Normal breath sounds. No wheezing, rhonchi or rales.   Chest:      Chest wall: No tenderness.   Musculoskeletal:      Left shoulder: Tenderness present. No swelling, deformity, effusion or laceration. Decreased range of motion.      Comments: TTP to posterior and lateral shoulder musculature. Non TTP to AC joint.   She has significantly reduced active ROM with stated pain.   Skin:     Capillary Refill: Capillary refill takes less than 2 seconds.   Neurological:      Mental Status: She is alert.      Sensory: Sensation is intact.         Procedures    Point of Care Test & Imaging Results from this visit  No results found for this visit on 09/27/24.   No results found.    Diagnostic study results (if any) were reviewed by Kalina Gimenez PA-C.    Assessment/Plan   Allergies, medications, history, and pertinent labs/EKGs/Imaging reviewed by Kalina Gimenez PA-C.     Medical Decision Making  MDM- Signs, symptoms, history, and examination consistent with non traumatic joint pain, most consistent with strain from sleeping wrong vs flare up from known arthritic changes. No evidence of cellulitis or septic arthritis. Will treat with Prednisone. She was given a referral to PT and ortho for further evaluation and treatment. Given no trauma and recent Xray of shoulder, she declined repeat xray today. Otherwise supportive measures for pain. Return to clinic or present to ED if symptoms change or worsen. Otherwise follow with PCP. Patient and parent verbalized understanding and agrees with plan.       Orders and Diagnoses  Diagnoses and all orders for this visit:  Acute pain of left shoulder  -     predniSONE (Deltasone) 20 mg tablet; Take 1 tablet (20 mg) by mouth once daily for 5 days.  -     Referral to Orthopaedic  Surgery; Future  -     Referral to Physical Therapy; Future      Medical Admin Record      Patient disposition: Home    Electronically signed by Kalina Gimenez PA-C  2:11 PM

## 2024-10-11 ENCOUNTER — OFFICE VISIT (OUTPATIENT)
Dept: URGENT CARE | Age: 84
End: 2024-10-11
Payer: MEDICARE

## 2024-10-11 VITALS
RESPIRATION RATE: 16 BRPM | DIASTOLIC BLOOD PRESSURE: 76 MMHG | OXYGEN SATURATION: 97 % | HEART RATE: 89 BPM | SYSTOLIC BLOOD PRESSURE: 131 MMHG | TEMPERATURE: 97.2 F

## 2024-10-11 DIAGNOSIS — S90.424A BLISTER OF TOE OF RIGHT FOOT WITHOUT INFECTION, INITIAL ENCOUNTER: Primary | ICD-10-CM

## 2024-10-11 ASSESSMENT — ENCOUNTER SYMPTOMS
CONSTITUTIONAL NEGATIVE: 1
COLOR CHANGE: 1
WOUND: 1

## 2024-10-11 NOTE — PROGRESS NOTES
Subjective   Patient ID: Roxi Barrios is a 84 y.o. female. They present today with a chief complaint of Foot Blister.    History of Present Illness  84-year-old female presents to clinic today with complaints of blister to her right second toe.  She states been there for about 3 to 4 days.  She states that her big toe has been over that toe but is unsure of why.  Denies any injury.          Past Medical History  Allergies as of 10/11/2024 - Reviewed 09/27/2024   Allergen Reaction Noted    Clindamycin Nausea/vomiting 04/03/2017    Seldane Other 04/05/2014    Seldane-d Unknown 10/16/2023    Amoxicillin Hives 10/16/2023    Levofloxacin Other and Hives 04/03/2017    Terfenadine-pseudoephedrine Other 10/16/2023    Tetracycline hcl GI Upset 10/17/2023    Vancomycin Hives 10/16/2023       (Not in a hospital admission)       Past Medical History:   Diagnosis Date    Acute cystitis with hematuria 11/18/2021    Acute cystitis with hematuria    Other specified noninflammatory disorders of vagina 07/28/2021    Vaginal odor    Pain, unspecified 11/17/2021    Body aches    Personal history of other diseases of the musculoskeletal system and connective tissue     History of spinal stenosis    Personal history of other diseases of the musculoskeletal system and connective tissue 06/30/2020    History of back pain    Personal history of other diseases of the musculoskeletal system and connective tissue 06/10/2020    History of bone disorder    Personal history of other diseases of the respiratory system 09/30/2020    History of pulmonary fibrosis    Personal history of other mental and behavioral disorders     History of depression    Personal history of other specified conditions 09/30/2020    History of chronic cough    Personal history of other specified conditions 01/26/2021    History of dysuria    Personal history of other specified conditions 06/13/2016    History of dyspnea       Past Surgical History:   Procedure Laterality  Date    BACK SURGERY  06/13/2016    Back Surgery    BREAST LUMPECTOMY  06/13/2016    Breast Surgery Lumpectomy    CHOLECYSTECTOMY  06/08/2016    Cholecystectomy Laparoscopic    FOOT SURGERY  06/08/2016    Foot Surgery    HERNIA REPAIR  06/08/2016    Hernia Repair    TOTAL KNEE ARTHROPLASTY  06/08/2016    Knee Replacement        reports that she has quit smoking. Her smoking use included cigarettes. She has never used smokeless tobacco. She reports that she does not currently use alcohol. She reports that she does not use drugs.    Review of Systems  Review of Systems   Constitutional: Negative.    Skin:  Positive for color change and wound.                                  Objective    Vitals:    10/11/24 1430   BP: 131/76   Pulse: 89   Resp: 16   Temp: 36.2 °C (97.2 °F)   SpO2: 97%     No LMP recorded.    Physical Exam  Constitutional:       Appearance: Normal appearance.   Feet:      Comments: Hallux valgus deformity noted right great toe  Skin:     Comments: Intact blister to right second toe   Neurological:      Mental Status: She is alert.         Procedures    Point of Care Test & Imaging Results from this visit  No results found for this visit on 10/11/24.   No results found.    Diagnostic study results (if any) were reviewed by El San PA-C.    Assessment/Plan   Allergies, medications, history, and pertinent labs/EKGs/Imaging reviewed by El San PA-C.     Medical Decision Making  On examination there is a bunion deformity to the right great toe causing that to rub on the second toe.  This has led to now a blister.  I advised her to wear a blister Band-Aid over the area.  Wear roomy comfortable shoes.  If the area were to open the clean with gentle soap and water and watch for signs of infection.  Follow-up with podiatry referral.    Orders and Diagnoses  Diagnoses and all orders for this visit:  Blister of toe of right foot without infection, initial encounter  -     Referral to Podiatry;  Future      Medical Admin Record      Patient disposition: Home    Electronically signed by El San PA-C  2:56 PM

## 2024-10-11 NOTE — PATIENT INSTRUCTIONS
Use blister Band-Aids to prevent more rubbing and inflammation    Wear roomy comfortable shoes    Stay off feet is much as possible    If blister does open, clean with gentle soap and water.    Watch for signs of infection such as increase in redness, pus drainage, streaking if so go to the ER.    Follow-up with podiatry referral.

## 2024-12-16 ENCOUNTER — APPOINTMENT (OUTPATIENT)
Dept: PRIMARY CARE | Facility: CLINIC | Age: 84
End: 2024-12-16
Payer: MEDICARE

## 2024-12-16 VITALS
HEART RATE: 60 BPM | SYSTOLIC BLOOD PRESSURE: 110 MMHG | WEIGHT: 155 LBS | BODY MASS INDEX: 27.46 KG/M2 | HEIGHT: 63 IN | DIASTOLIC BLOOD PRESSURE: 70 MMHG

## 2024-12-16 DIAGNOSIS — M05.742 RHEUMATOID ARTHRITIS INVOLVING BOTH HANDS WITH POSITIVE RHEUMATOID FACTOR (MULTI): ICD-10-CM

## 2024-12-16 DIAGNOSIS — M35.3 PMR (POLYMYALGIA RHEUMATICA) (MULTI): ICD-10-CM

## 2024-12-16 DIAGNOSIS — E55.9 VITAMIN D DEFICIENCY: ICD-10-CM

## 2024-12-16 DIAGNOSIS — M79.7 CHRONIC FATIGUE SYNDROME WITH FIBROMYALGIA: ICD-10-CM

## 2024-12-16 DIAGNOSIS — M06.4 POLYARTHROPATHY, INFLAMMATORY (MULTI): ICD-10-CM

## 2024-12-16 DIAGNOSIS — J30.89 PERENNIAL ALLERGIC RHINITIS WITH SEASONAL VARIATION: ICD-10-CM

## 2024-12-16 DIAGNOSIS — Z00.00 MEDICARE ANNUAL WELLNESS VISIT, SUBSEQUENT: Primary | ICD-10-CM

## 2024-12-16 DIAGNOSIS — Z00.00 ROUTINE GENERAL MEDICAL EXAMINATION AT HEALTH CARE FACILITY: ICD-10-CM

## 2024-12-16 DIAGNOSIS — J30.2 PERENNIAL ALLERGIC RHINITIS WITH SEASONAL VARIATION: ICD-10-CM

## 2024-12-16 DIAGNOSIS — M81.0 OSTEOPOROSIS, SENILE: ICD-10-CM

## 2024-12-16 DIAGNOSIS — M05.741 RHEUMATOID ARTHRITIS INVOLVING BOTH HANDS WITH POSITIVE RHEUMATOID FACTOR (MULTI): ICD-10-CM

## 2024-12-16 DIAGNOSIS — G93.32 CHRONIC FATIGUE SYNDROME WITH FIBROMYALGIA: ICD-10-CM

## 2024-12-16 DIAGNOSIS — M35.02: ICD-10-CM

## 2024-12-16 DIAGNOSIS — B02.30 HERPES ZOSTER OPHTHALMICUS OF RIGHT EYE: ICD-10-CM

## 2024-12-16 DIAGNOSIS — Z23 FLU VACCINE NEED: ICD-10-CM

## 2024-12-16 PROCEDURE — 99497 ADVNCD CARE PLAN 30 MIN: CPT | Performed by: INTERNAL MEDICINE

## 2024-12-16 PROCEDURE — G0008 ADMIN INFLUENZA VIRUS VAC: HCPCS | Performed by: INTERNAL MEDICINE

## 2024-12-16 PROCEDURE — 1123F ACP DISCUSS/DSCN MKR DOCD: CPT | Performed by: INTERNAL MEDICINE

## 2024-12-16 PROCEDURE — G0444 DEPRESSION SCREEN ANNUAL: HCPCS | Performed by: INTERNAL MEDICINE

## 2024-12-16 PROCEDURE — 1036F TOBACCO NON-USER: CPT | Performed by: INTERNAL MEDICINE

## 2024-12-16 PROCEDURE — 1159F MED LIST DOCD IN RCRD: CPT | Performed by: INTERNAL MEDICINE

## 2024-12-16 PROCEDURE — 1170F FXNL STATUS ASSESSED: CPT | Performed by: INTERNAL MEDICINE

## 2024-12-16 PROCEDURE — 1158F ADVNC CARE PLAN TLK DOCD: CPT | Performed by: INTERNAL MEDICINE

## 2024-12-16 PROCEDURE — 1160F RVW MEDS BY RX/DR IN RCRD: CPT | Performed by: INTERNAL MEDICINE

## 2024-12-16 PROCEDURE — G0439 PPPS, SUBSEQ VISIT: HCPCS | Performed by: INTERNAL MEDICINE

## 2024-12-16 PROCEDURE — 90662 IIV NO PRSV INCREASED AG IM: CPT | Performed by: INTERNAL MEDICINE

## 2024-12-16 PROCEDURE — 99214 OFFICE O/P EST MOD 30 MIN: CPT | Performed by: INTERNAL MEDICINE

## 2024-12-16 ASSESSMENT — ANXIETY QUESTIONNAIRES
6. BECOMING EASILY ANNOYED OR IRRITABLE: NOT AT ALL
GAD7 TOTAL SCORE: 0
4. TROUBLE RELAXING: NOT AT ALL
2. NOT BEING ABLE TO STOP OR CONTROL WORRYING: NOT AT ALL
1. FEELING NERVOUS, ANXIOUS, OR ON EDGE: NOT AT ALL
5. BEING SO RESTLESS THAT IT IS HARD TO SIT STILL: NOT AT ALL
7. FEELING AFRAID AS IF SOMETHING AWFUL MIGHT HAPPEN: NOT AT ALL
3. WORRYING TOO MUCH ABOUT DIFFERENT THINGS: NOT AT ALL
IF YOU CHECKED OFF ANY PROBLEMS ON THIS QUESTIONNAIRE, HOW DIFFICULT HAVE THESE PROBLEMS MADE IT FOR YOU TO DO YOUR WORK, TAKE CARE OF THINGS AT HOME, OR GET ALONG WITH OTHER PEOPLE: NOT DIFFICULT AT ALL

## 2024-12-16 ASSESSMENT — ACTIVITIES OF DAILY LIVING (ADL)
MANAGING_FINANCES: INDEPENDENT
TAKING_MEDICATION: INDEPENDENT
DOING_HOUSEWORK: INDEPENDENT
BATHING: INDEPENDENT
DRESSING: INDEPENDENT
GROCERY_SHOPPING: INDEPENDENT

## 2024-12-16 ASSESSMENT — ENCOUNTER SYMPTOMS
LOSS OF SENSATION IN FEET: 0
DEPRESSION: 0
OCCASIONAL FEELINGS OF UNSTEADINESS: 0

## 2024-12-16 ASSESSMENT — PATIENT HEALTH QUESTIONNAIRE - PHQ9
1. LITTLE INTEREST OR PLEASURE IN DOING THINGS: NOT AT ALL
10. IF YOU CHECKED OFF ANY PROBLEMS, HOW DIFFICULT HAVE THESE PROBLEMS MADE IT FOR YOU TO DO YOUR WORK, TAKE CARE OF THINGS AT HOME, OR GET ALONG WITH OTHER PEOPLE: NOT DIFFICULT AT ALL
SUM OF ALL RESPONSES TO PHQ9 QUESTIONS 1 AND 2: 1
2. FEELING DOWN, DEPRESSED OR HOPELESS: SEVERAL DAYS

## 2024-12-16 NOTE — ASSESSMENT & PLAN NOTE
Sed rate at baseline 35 blood work recently completed by rheumatologist no indication for corticosteroids at this time  Orders:    Follow Up [facility] - PCP - Established    Follow Up [facility] - PCP - Established; Future

## 2024-12-16 NOTE — ASSESSMENT & PLAN NOTE
Continue with methotrexate weekly poor response to trials of immunotherapy  Orders:    Follow Up [facility] - PCP - Established    Follow Up [facility] - PCP - Established; Future

## 2024-12-16 NOTE — ASSESSMENT & PLAN NOTE
Continues management with rheumatologist  Orders:    Follow Up In [facility] - PCP - Established

## 2024-12-16 NOTE — ASSESSMENT & PLAN NOTE
Poor responder to medication trials with duloxetine and gabapentin in the past and pregabalin caused side effects continue to encourage better sleep and regular exercise as tolerated  Orders:    [facility] - PCP - Established    Follow [facility] - PCP - Established; Future

## 2024-12-16 NOTE — ASSESSMENT & PLAN NOTE
Discussed advanced medical directives with medical power of  up-to-date with vaccinations and screenings

## 2024-12-16 NOTE — PROGRESS NOTES
"Depression and anxiety screening completed   PHQ9 score   GAD7 score   I spent 15 minutes obtaining and discussing depression screening using PHQ 2 questions with results documented in chart.  Screening using PHQ-9 and JESSICA-7 scores were used for follow-up with treatment and referral plan discussed.      I spent greater than 15 minutes discussing advance care planning including the explanation and discussion of advanced directives.  If patient does not have current up-to-date documents examples and information provided on how to create both living will and power of .  toolkit was given to patient and was encouraged to work out completing these documents.Subjective   Reason for Visit: Roxi Barrios is an 84 y.o. female here for a Medicare Wellness visit.     Past Medical, Surgical, and Family History reviewed and updated in chart.    Reviewed all medications by prescribing practitioner or clinical pharmacist (such as prescriptions, OTCs, herbal therapies and supplements) and documented in the medical record.    HPI    Patient Care Team:  Raji Freedman DO as PCP - General  Raji Freedman DO as PCP - [facility] Attributed Provider     Review of Systems   All other systems reviewed and are negative.      Objective   Vitals:  /70   Pulse 60   Ht 1.6 m (5' 3\")   Wt 70.3 kg (155 lb)   BMI 27.46 kg/m²       Physical Exam  Vitals and nursing note reviewed.   Constitutional:       General: She is not in acute distress.     Appearance: Normal appearance. She is well-developed. She is not toxic-appearing.   HENT:      Head: Normocephalic and atraumatic.      Right Ear: Tympanic membrane and external ear normal.      Left Ear: Tympanic membrane and external ear normal.      Nose: Nose normal.      Mouth/Throat:      Mouth: Mucous membranes are moist.      Pharynx: Oropharynx is clear. No oropharyngeal exudate or posterior oropharyngeal erythema.      Tonsils: No tonsillar exudate. 2+ on the right. 2+ " on the left.   Eyes:      Extraocular Movements: Extraocular movements intact.      Conjunctiva/sclera: Conjunctivae normal.   Cardiovascular:      Rate and Rhythm: Normal rate and regular rhythm.      Pulses: Normal pulses.      Heart sounds: Normal heart sounds. No murmur heard.  Pulmonary:      Effort: Pulmonary effort is normal.      Breath sounds: Normal breath sounds.   Abdominal:      General: Abdomen is flat. Bowel sounds are normal.      Palpations: Abdomen is soft.   Musculoskeletal:      Cervical back: Neck supple.   Lymphadenopathy:      Cervical: No cervical adenopathy.   Skin:     General: Skin is warm and dry.      Findings: No rash.   Neurological:      Mental Status: She is alert. Mental status is at baseline.   Psychiatric:         Mood and Affect: Mood normal.         Behavior: Behavior normal.         Thought Content: Thought content normal.         Judgment: Judgment normal.         Assessment & Plan  Disorder of lung with Sjogren's syndrome (Multi)  No exacerbations at this time stable  Orders:    [facility] - PCP - Established    PMR (polymyalgia rheumatica) (Multi)  Sed rate at baseline 35 blood work recently completed by rheumatologist no indication for corticosteroids at this time  Orders:    [facility] - PCP - Established    [facility] - PCP - Established; Future    Polyarthropathy, inflammatory (Multi)  Continues on methotrexate weekly  Orders:    [facility] - PCP - Established    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)  Continue with methotrexate weekly poor response to trials of immunotherapy  Orders:    [facility] - PCP - Established    [facility] - PCP - Established; Future    Chronic fatigue syndrome with fibromyalgia  Poor responder to medication trials with duloxetine and gabapentin in the past and  pregabalin caused side effects continue to encourage better sleep and regular exercise as tolerated  Orders:    [facility] - PCP - Established    [facility] - PCP - Established; Future    Vitamin D deficiency  Reevaluate vitamin D in the setting of osteoporosis management  Orders:    [facility] - PCP - Established    Perennial allergic rhinitis with seasonal variation  Stable at this time no exacerbation updated influenza vaccine  Orders:    [facility] - PCP - Established    Osteoporosis, senile  Continues management with rheumatologist  Orders:    [facility] - PCP - Established    Flu vaccine need  High-dose influenza vaccine updated today  Orders:    Flu vaccine, trivalent, preservative free, HIGH-DOSE, age 65y+ (Fluzone)    Medicare annual wellness visit, subsequent  Discussed advanced medical directives with medical power of  up-to-date with vaccinations and screenings       Herpes zoster ophthalmicus of right eye  Patient with ongoing inflammation of right eye reevaluation with ophthalmologist for optimal therapy and further evaluation of inflammation  Orders:    Referral to Ophthalmology; Future    [facility] - PCP - Established; Future    Routine general medical examination at health care facility    Orders:    1 Year Follow Up In [facility] - PCP - Wellness Exam; Future

## 2024-12-16 NOTE — ASSESSMENT & PLAN NOTE
High-dose influenza vaccine updated today  Orders:    Flu vaccine, trivalent, preservative free, HIGH-DOSE, age 65y+ (Fluzone)

## 2024-12-16 NOTE — ASSESSMENT & PLAN NOTE
Stable at this time no exacerbation updated influenza vaccine  Orders:    Follow [facility] - PCP - Established

## 2024-12-16 NOTE — ASSESSMENT & PLAN NOTE
Reevaluate vitamin D in the setting of osteoporosis management  Orders:    Follow [facility] - PCP - Established

## 2024-12-16 NOTE — ASSESSMENT & PLAN NOTE
Patient with ongoing inflammation of right eye reevaluation with ophthalmologist for optimal therapy and further evaluation of inflammation  Orders:    Referral to Ophthalmology; Future    [facility] - PCP - Established; Future

## 2024-12-16 NOTE — ASSESSMENT & PLAN NOTE
No exacerbations at this time stable  Orders:    [facility] - PCP - Established

## 2024-12-16 NOTE — ASSESSMENT & PLAN NOTE
Continues on methotrexate weekly  Orders:    [facility] - PCP - Established

## 2024-12-16 NOTE — PROGRESS NOTES
"Subjective   Reason for Visit: Roxi Barrios is an 84 y.o. female here for a Medicare Wellness visit.          Reviewed all medications by prescribing practitioner or clinical pharmacist (such as prescriptions, OTCs, herbal therapies and supplements) and documented in the medical record.    HPI    Patient Care Team:  Raji Freedman DO as PCP - General  Raji Freedman DO as PCP - [facility]     Review of Systems    Objective   Vitals:  /70   Pulse 60   Ht 1.6 m (5' 3\")   Wt 70.3 kg (155 lb)   BMI 27.46 kg/m²       Physical Exam    Assessment & Plan  Disorder of lung with Sjogren's syndrome (Multi)    Orders:    [facility] - PCP - Established    PMR (polymyalgia rheumatica) (Multi)    Orders:    [facility] - PCP - Established    Polyarthropathy, inflammatory (Multi)    Orders:    [facility] - PCP - Established    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)    Orders:    [facility] - PCP - Established    Chronic fatigue syndrome with fibromyalgia    Orders:    [facility] - PCP - Established    Vitamin D deficiency    Orders:    [facility] - PCP - Established    Perennial allergic rhinitis with seasonal variation    Orders:    [facility] - PCP - Established    Osteoporosis, senile    Orders:    [facility] - PCP - Established    Flu vaccine need    Orders:    Flu vaccine, trivalent, preservative free, HIGH-DOSE, age 65y+ (Fluzone)              "

## 2025-04-22 ENCOUNTER — OFFICE VISIT (OUTPATIENT)
Dept: PRIMARY CARE | Facility: CLINIC | Age: 85
End: 2025-04-22
Payer: MEDICARE

## 2025-04-22 VITALS
BODY MASS INDEX: 26.46 KG/M2 | HEIGHT: 64 IN | OXYGEN SATURATION: 97 % | HEART RATE: 76 BPM | DIASTOLIC BLOOD PRESSURE: 72 MMHG | SYSTOLIC BLOOD PRESSURE: 118 MMHG | WEIGHT: 155 LBS

## 2025-04-22 DIAGNOSIS — L25.9 CONTACT DERMATITIS, UNSPECIFIED CONTACT DERMATITIS TYPE, UNSPECIFIED TRIGGER: Primary | ICD-10-CM

## 2025-04-22 PROBLEM — L24.89 IRRITANT CONTACT DERMATITIS DUE TO OTHER AGENTS: Status: ACTIVE | Noted: 2025-04-22

## 2025-04-22 PROCEDURE — 1036F TOBACCO NON-USER: CPT

## 2025-04-22 PROCEDURE — 1159F MED LIST DOCD IN RCRD: CPT

## 2025-04-22 PROCEDURE — 99213 OFFICE O/P EST LOW 20 MIN: CPT

## 2025-04-22 RX ORDER — METHOTREXATE 2.5 MG/1
TABLET ORAL
COMMUNITY
Start: 2025-01-28

## 2025-04-22 RX ORDER — TRIAMCINOLONE ACETONIDE 1 MG/G
CREAM TOPICAL
Qty: 15 G | Refills: 0 | Status: SHIPPED | OUTPATIENT
Start: 2025-04-22

## 2025-04-22 RX ORDER — TRIAMCINOLONE ACETONIDE 1 MG/G
CREAM TOPICAL 2 TIMES DAILY
Qty: 15 G | Refills: 0 | Status: SHIPPED | OUTPATIENT
Start: 2025-04-22 | End: 2025-04-22

## 2025-04-22 ASSESSMENT — ENCOUNTER SYMPTOMS
LOSS OF SENSATION IN FEET: 0
OCCASIONAL FEELINGS OF UNSTEADINESS: 1
CHILLS: 0
DEPRESSION: 0
FEVER: 0

## 2025-04-22 ASSESSMENT — COLUMBIA-SUICIDE SEVERITY RATING SCALE - C-SSRS
6. HAVE YOU EVER DONE ANYTHING, STARTED TO DO ANYTHING, OR PREPARED TO DO ANYTHING TO END YOUR LIFE?: NO
1. IN THE PAST MONTH, HAVE YOU WISHED YOU WERE DEAD OR WISHED YOU COULD GO TO SLEEP AND NOT WAKE UP?: NO
2. HAVE YOU ACTUALLY HAD ANY THOUGHTS OF KILLING YOURSELF?: NO

## 2025-04-22 ASSESSMENT — PATIENT HEALTH QUESTIONNAIRE - PHQ9
1. LITTLE INTEREST OR PLEASURE IN DOING THINGS: SEVERAL DAYS
10. IF YOU CHECKED OFF ANY PROBLEMS, HOW DIFFICULT HAVE THESE PROBLEMS MADE IT FOR YOU TO DO YOUR WORK, TAKE CARE OF THINGS AT HOME, OR GET ALONG WITH OTHER PEOPLE: NOT DIFFICULT AT ALL
2. FEELING DOWN, DEPRESSED OR HOPELESS: SEVERAL DAYS
SUM OF ALL RESPONSES TO PHQ9 QUESTIONS 1 AND 2: 2

## 2025-04-22 NOTE — PROGRESS NOTES
"Subjective   Patient ID: Roxi Barrios is a 85 y.o. female who presents for Acute Visit (Pt here for rash on her back for 1 month, and its like blisters.).    HPI   Reports she started having a rash to her back over a month ago and it spread over time and now it's scattered. They itch and she has the tendency to scratch. Started with scabs and now it's scattered to right arm and some to left side of the back, nothing on the left arm. Does not itch constantly and itching is not worse at night time. Does not have pain. Has applied some kind of abx ointment but did not help.  Does not remembering coming in contact with anything that may have triggered it. Used to have a dog but it passed away a month ago. Has not switched hygiene products. No fever or chills.     Review of Systems   Constitutional:  Negative for chills and fever.   Skin:  Positive for rash.   All other systems reviewed and are negative.      Objective   /72 (BP Location: Left arm, Patient Position: Sitting)   Pulse 76   Ht 1.626 m (5' 4\")   Wt 70.3 kg (155 lb)   SpO2 97%   BMI 26.61 kg/m²     Physical Exam  Constitutional:       Appearance: Normal appearance.   Cardiovascular:      Rate and Rhythm: Normal rate and regular rhythm.      Pulses: Normal pulses.      Heart sounds: Normal heart sounds.   Pulmonary:      Effort: Pulmonary effort is normal.      Breath sounds: Normal breath sounds.   Skin:     General: Skin is warm and dry.      Findings: Rash present.      Comments: Scattered to right shoulder/upper arm, right and left upper back, some scabbed, few open erythema, no drainage, no tenderness, no blisters   Neurological:      Mental Status: She is alert and oriented to person, place, and time.         Assessment & Plan  Contact dermatitis, unspecified contact dermatitis type, unspecified trigger  -Educated patient to wash her bed sheets and clothes in warm water  -Clean the sites and apply Kenalog cream 1-2 times as needed to help " relief itching  -Keep the area dry and clean  -If it's not getting better with the cream and more rashes appear, call the office.   Orders:    triamcinolone (Kenalog) 0.1 % cream; Apply to affected area 1-2 times daily as needed.

## 2025-06-16 ENCOUNTER — APPOINTMENT (OUTPATIENT)
Dept: PRIMARY CARE | Facility: CLINIC | Age: 85
End: 2025-06-16
Payer: MEDICARE

## 2025-06-16 VITALS
DIASTOLIC BLOOD PRESSURE: 64 MMHG | HEIGHT: 64 IN | WEIGHT: 155 LBS | BODY MASS INDEX: 26.46 KG/M2 | SYSTOLIC BLOOD PRESSURE: 122 MMHG | HEART RATE: 96 BPM

## 2025-06-16 DIAGNOSIS — M79.7 CHRONIC FATIGUE SYNDROME WITH FIBROMYALGIA: ICD-10-CM

## 2025-06-16 DIAGNOSIS — M35.02: ICD-10-CM

## 2025-06-16 DIAGNOSIS — M05.741 RHEUMATOID ARTHRITIS INVOLVING BOTH HANDS WITH POSITIVE RHEUMATOID FACTOR (MULTI): Primary | ICD-10-CM

## 2025-06-16 DIAGNOSIS — G93.32 CHRONIC FATIGUE SYNDROME WITH FIBROMYALGIA: ICD-10-CM

## 2025-06-16 DIAGNOSIS — E55.9 VITAMIN D DEFICIENCY: ICD-10-CM

## 2025-06-16 DIAGNOSIS — M06.4 POLYARTHROPATHY, INFLAMMATORY (MULTI): ICD-10-CM

## 2025-06-16 DIAGNOSIS — M05.742 RHEUMATOID ARTHRITIS INVOLVING BOTH HANDS WITH POSITIVE RHEUMATOID FACTOR (MULTI): Primary | ICD-10-CM

## 2025-06-16 DIAGNOSIS — M35.3 PMR (POLYMYALGIA RHEUMATICA) (MULTI): ICD-10-CM

## 2025-06-16 PROCEDURE — 99213 OFFICE O/P EST LOW 20 MIN: CPT | Performed by: INTERNAL MEDICINE

## 2025-06-16 PROCEDURE — 1159F MED LIST DOCD IN RCRD: CPT | Performed by: INTERNAL MEDICINE

## 2025-06-16 PROCEDURE — G2211 COMPLEX E/M VISIT ADD ON: HCPCS | Performed by: INTERNAL MEDICINE

## 2025-06-16 PROCEDURE — 1036F TOBACCO NON-USER: CPT | Performed by: INTERNAL MEDICINE

## 2025-06-16 PROCEDURE — 1160F RVW MEDS BY RX/DR IN RCRD: CPT | Performed by: INTERNAL MEDICINE

## 2025-06-16 ASSESSMENT — ENCOUNTER SYMPTOMS
BACK PAIN: 1
ARTHRALGIAS: 1
MYALGIAS: 1

## 2025-06-16 NOTE — PROGRESS NOTES
"Subjective   Patient ID: Roxi Barrios is a 85 y.o. female who presents for Follow-up and Back Pain (Started about 2 week ago. Can't think if she did anything ).    HPI     Review of Systems    Objective   /64   Pulse 96   Ht 1.626 m (5' 4\")   Wt 70.3 kg (155 lb)   BMI 26.61 kg/m²     Physical Exam    Assessment/Plan          "

## 2025-06-16 NOTE — ASSESSMENT & PLAN NOTE
Check inflammatory levels and lab work with follow-up rheumatologist continue methotrexate weekly  Orders:    Follow [facility] - PCP - Established    Sedimentation Rate; Future    C-reactive protein; Future    Comprehensive metabolic panel; Future    Tsh With Reflex To Free T4 If Abnormal; Future    CBC and Auto Differential; Future    Vitamin D 25-Hydroxy,Total (for eval of Vitamin D levels); Future    Follow [facility] - PCP - Medicare Annual; Future    Referral to Rheumatology; Future

## 2025-06-16 NOTE — PROGRESS NOTES
"Subjective   Reason for Visit: Roxi Barrios is an 85 y.o. female here for a  visit.     Past Medical, Surgical, and Family History reviewed and updated in chart.    Reviewed all medications by prescribing practitioner or clinical pharmacist (such as prescriptions, OTCs, herbal therapies and supplements) and documented in the medical record.    Back Pain  This is a chronic problem. The current episode started more than 1 year ago. The problem occurs constantly. The problem is unchanged. The pain is present in the lumbar spine and thoracic spine. The pain is at a severity of 3/10. The pain is moderate. The pain is The same all the time. The symptoms are aggravated by bending, sitting, position and standing. Stiffness is present All day. Risk factors include history of osteoporosis, history of steroid use, lack of exercise, obesity, poor posture and sedentary lifestyle. She has tried muscle relaxant, home exercises, analgesics and bed rest for the symptoms. The treatment provided mild relief.       Patient Care Team:  Raji Freedman DO as PCP - General  Raji Freedman DO as PCP - [facility] Attributed Provider     Review of Systems   Musculoskeletal:  Positive for arthralgias, back pain and myalgias.   All other systems reviewed and are negative.      Objective   Vitals:  /64   Pulse 96   Ht 1.626 m (5' 4\")   Wt 70.3 kg (155 lb)   BMI 26.61 kg/m²       Physical Exam  Vitals and nursing note reviewed.   Constitutional:       General: She is not in acute distress.     Appearance: Normal appearance. She is well-developed. She is not toxic-appearing.   HENT:      Head: Normocephalic and atraumatic.      Right Ear: Tympanic membrane and external ear normal.      Left Ear: Tympanic membrane and external ear normal.      Nose: Nose normal.      Mouth/Throat:      Mouth: Mucous membranes are moist.      Pharynx: Oropharynx is clear. No oropharyngeal exudate or posterior oropharyngeal erythema.      Tonsils: " No tonsillar exudate. 2+ on the right. 2+ on the left.   Eyes:      Extraocular Movements: Extraocular movements intact.      Conjunctiva/sclera: Conjunctivae normal.   Cardiovascular:      Rate and Rhythm: Normal rate and regular rhythm.      Pulses: Normal pulses.      Heart sounds: Normal heart sounds. No murmur heard.  Pulmonary:      Effort: Pulmonary effort is normal.      Breath sounds: Normal breath sounds.   Abdominal:      General: Abdomen is flat. Bowel sounds are normal.      Palpations: Abdomen is soft.   Musculoskeletal:      Cervical back: Neck supple.   Lymphadenopathy:      Cervical: No cervical adenopathy.   Skin:     General: Skin is warm and dry.      Findings: No rash.   Neurological:      Mental Status: She is alert. Mental status is at baseline.   Psychiatric:         Mood and Affect: Mood normal.         Behavior: Behavior normal.         Thought Content: Thought content normal.         Judgment: Judgment normal.     This may not meet the criteria for a clinical depression diagnosis. Symptoms were reviewed with Roxi.  Follow-up within the next 3 months is recommended to re-assess symptoms and monitor mental health status.     Assessment & Plan  PMR (polymyalgia rheumatica) (Multi)  Appears stable at this time tapered off chronic prednisone by rheumatologist will check inflammatory levels  Orders:    [facility] - PCP - Established    Sedimentation Rate; Future    C-reactive protein; Future    Comprehensive metabolic panel; Future    Tsh With Reflex To Free T4 If Abnormal; Future    CBC and Auto Differential; Future    Vitamin D 25-Hydroxy,Total (for eval of Vitamin D levels); Future    Follow [facility] - PCP - Medicare Annual; Future    Referral to Rheumatology; Future    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)  Check inflammatory levels and lab work with follow-up rheumatologist continue methotrexate  weekly  Orders:    Follow Up In [facility] [place] - PCP - Established    Sedimentation Rate; Future    C-reactive protein; Future    Comprehensive metabolic panel; Future    Tsh With Reflex To Free T4 If Abnormal; Future    CBC and Auto Differential; Future    Vitamin D 25-Hydroxy,Total (for eval of Vitamin D levels); Future    Follow [facility] - PCP - Medicare Annual; Future    Referral to Rheumatology; Future    Chronic fatigue syndrome with fibromyalgia  Poor response with SSRI.  Encouraged exercise program as outpatient or to look at services for older individuals such as community health patient isolated lives alone asked patient to look into her community services  Orders:    Follow Up In [facility] - PCP - Established    Sedimentation Rate; Future    C-reactive protein; Future    Comprehensive metabolic panel; Future    Tsh With Reflex To Free T4 If Abnormal; Future    CBC and Auto Differential; Future    Vitamin D 25-Hydroxy,Total (for eval of Vitamin D levels); Future    Follow Up In [facility] - PCP - Medicare Annual; Future    Referral to Rheumatology; Future    Polyarthropathy, inflammatory (Multi)  Reevaluate inflammatory levels no evidence of acute synovitis on exam  Orders:    Sedimentation Rate; Future    C-reactive protein; Future    Comprehensive metabolic panel; Future    Tsh With Reflex To Free T4 If Abnormal; Future    CBC and Auto Differential; Future    Vitamin D 25-Hydroxy,Total (for eval of Vitamin D levels); Future    Follow [facility] In [facility] - PCP - Medicare Annual; Future    Vitamin D deficiency  History of osteoporosis check vitamin D level with lack of outside exposure and risk for further fracture and fall  Orders:    Sedimentation Rate; Future    C-reactive protein; Future    Comprehensive metabolic panel; Future    Tsh With Reflex To Free T4 If Abnormal; Future    CBC and Auto Differential; Future    Vitamin D 25-Hydroxy,Total  (for eval of Vitamin D levels); Future    Follow Up In [facility] - [facility] - Medicare Annual; Future    Disorder of lung with Sjogren's syndrome (Multi)  Stable unchanged at this time continue to monitor with rheumatologist up-to-date with vaccinations and screenings follow-up in 6 months  Orders:    Sedimentation Rate; Future    C-reactive protein; Future    Comprehensive metabolic panel; Future    Tsh With Reflex To Free T4 If Abnormal; Future    CBC and Auto Differential; Future    Vitamin D 25-Hydroxy,Total (for eval of Vitamin D levels); Future    Follow Up In [facility] - [facility] - Medicare Annual; Future    Referral to Rheumatology; Future

## 2025-06-16 NOTE — ASSESSMENT & PLAN NOTE
Appears stable at this time tapered off chronic prednisone by rheumatologist will check inflammatory levels  Orders:    Follow Up In [facility] - PCP - Established    Sedimentation Rate; Future    C-reactive protein; Future    Comprehensive metabolic panel; Future    Tsh With Reflex To Free T4 If Abnormal; Future    CBC and Auto Differential; Future    Vitamin D 25-Hydroxy,Total (for eval of Vitamin D levels); Future    Follow Up In [facility] - PCP - Medicare Annual; Future    Referral to Rheumatology; Future

## 2025-06-16 NOTE — ASSESSMENT & PLAN NOTE
Poor response with SSRI.  Encouraged exercise program as outpatient or to look at services for older individuals such as community health patient isolated lives alone asked patient to look into her community services  Orders:    Follow Up In [facility] - PCP - Established    Sedimentation Rate; Future    C-reactive protein; Future    Comprehensive metabolic panel; Future    Tsh With Reflex To Free T4 If Abnormal; Future    CBC and Auto Differential; Future    Vitamin D 25-Hydroxy,Total (for eval of Vitamin D levels); Future    Follow Up In [facility] - PCP - Medicare Annual; Future    Referral to Rheumatology; Future

## 2025-06-16 NOTE — ASSESSMENT & PLAN NOTE
Stable unchanged at this time continue to monitor with rheumatologist up-to-date with vaccinations and screenings follow-up in 6 months  Orders:    Sedimentation Rate; Future    C-reactive protein; Future    Comprehensive metabolic panel; Future    Tsh With Reflex To Free T4 If Abnormal; Future    CBC and Auto Differential; Future    Vitamin D 25-Hydroxy,Total (for eval of Vitamin D levels); Future    Follow Up In [facility] - PCP - Medicare Annual; Future    Referral to Rheumatology; Future

## 2025-06-16 NOTE — ASSESSMENT & PLAN NOTE
Reevaluate inflammatory levels no evidence of acute synovitis on exam  Orders:    Sedimentation Rate; Future    C-reactive protein; Future    Comprehensive metabolic panel; Future    Tsh With Reflex To Free T4 If Abnormal; Future    CBC and Auto Differential; Future    Vitamin D 25-Hydroxy,Total (for eval of Vitamin D levels); Future    Follow Up In [facility] - PCP - Medicare Annual; Future

## 2025-06-16 NOTE — ASSESSMENT & PLAN NOTE
History of osteoporosis check vitamin D level with lack of outside exposure and risk for further fracture and fall  Orders:    Sedimentation Rate; Future    C-reactive protein; Future    Comprehensive metabolic panel; Future    Tsh With Reflex To Free T4 If Abnormal; Future    CBC and Auto Differential; Future    Vitamin D 25-Hydroxy,Total (for eval of Vitamin D levels); Future    Follow Up In [facility] - PCP - Medicare Annual; Future

## 2025-08-27 ENCOUNTER — TELEPHONE (OUTPATIENT)
Dept: PRIMARY CARE | Facility: CLINIC | Age: 85
End: 2025-08-27
Payer: MEDICARE

## 2025-08-29 ENCOUNTER — TELEPHONE (OUTPATIENT)
Dept: PRIMARY CARE | Facility: CLINIC | Age: 85
End: 2025-08-29
Payer: MEDICARE

## 2025-12-16 ENCOUNTER — APPOINTMENT (OUTPATIENT)
Dept: PRIMARY CARE | Facility: CLINIC | Age: 85
End: 2025-12-16
Payer: MEDICARE